# Patient Record
Sex: MALE | Race: WHITE | Employment: STUDENT | URBAN - METROPOLITAN AREA
[De-identification: names, ages, dates, MRNs, and addresses within clinical notes are randomized per-mention and may not be internally consistent; named-entity substitution may affect disease eponyms.]

---

## 2022-03-14 DIAGNOSIS — M25.561 RIGHT KNEE PAIN, UNSPECIFIED CHRONICITY: Primary | ICD-10-CM

## 2022-03-14 DIAGNOSIS — M54.9 BACK PAIN, UNSPECIFIED BACK LOCATION, UNSPECIFIED BACK PAIN LATERALITY, UNSPECIFIED CHRONICITY: ICD-10-CM

## 2022-03-15 ENCOUNTER — HOSPITAL ENCOUNTER (OUTPATIENT)
Dept: PHYSICAL THERAPY | Age: 22
Setting detail: THERAPIES SERIES
Discharge: HOME | End: 2022-03-15
Payer: COMMERCIAL

## 2022-03-15 PROCEDURE — 97140 MANUAL THERAPY 1/> REGIONS: CPT

## 2022-03-15 PROCEDURE — 97161 PT EVAL LOW COMPLEX 20 MIN: CPT

## 2022-03-15 PROCEDURE — 97110 THERAPEUTIC EXERCISES: CPT

## 2022-03-15 NOTE — PLAN OF CARE
Gil Salazar  Phone: [phone]   Fax:     [phone]                                                       Physical Therapy Certification    Dear Dr. Jessica Fontaine,    We had the pleasure of evaluating the following patient for physical therapy services at [address] [place] [address]. A summary of our findings can be found in the initial assessment below. This includes our plan of care. If you have any questions or concerns regarding these findings, please do not hesitate to contact me at the office phone number checked above. Thank you for the referral.       Physician Signature:_______________________________Date:__________________  By signing above (or electronic signature), therapists plan is approved by physician      Patient: Nixon Tim \"Darek\"  : 2000   MRN: [id number]  Referring Physician:  Dr. Jessica Fontaine      Evaluation Date: 3/15/2022      Medical Diagnosis Information:   M25.561 R knee pain; M54.9 Back pain, unspecified location, laterality, and chronicity    M25.561 R knee pain; M54.9 Back pain, unspecified location, laterality, and chronicity               Insurance information:  BCBS     Precautions/ Contra-indications: None. Latex Allergy:  [x]NO      []YES  Preferred Language for Healthcare:   [x]English       []other:    C-SSRS Triggered by Intake questionnaire (Past 2 wk assessment ):   [x] No, Questionnaire did not trigger screening.   [] Yes, Patient intake triggered C-SSRS Screening      [] C-SSRS Screening completed  [] PCP notified via Epic     SUBJECTIVE: Patient stated complaint:  R knee pain started when patient was playing soccer two weeks ago 22. Was dribbling soccer ball when he took a step with the R foot and felt the knee collapse out from underneath him.  Pain onset immediately following that incident. Low back pain in lower lumbar region has been present for awhile now. Does not remember exactly when it onset or any specific LAWRENCE that brought pain on. Low back always seems to feel very tight and stiff. Middle back pain is much more recent. It started about a week before the R knee pain when patient was pause deadlifting. Still feels very sharp discomfort at a very specific level of his back when he does any lifting activities that require triple extension like dead lifting, power cleaning, etc.    Relevant Medical History:  None. Functional Scale/Score:  29% disability - LEFS (57/80); 12% disability - LIBBY (6/50)    Pain Scale: R knee:  1/10 at start of session. 1/10 at best. 3/10 at worst.  Low back:  0/10 at start of session. 0/10 at best. 3/10 at worst.   Easing factors:   Provocative factors: R knee:  Squatting. Prolonged walking >1 mile. Low back:  Any movements like deadlift that require lumbar extension. Type: [x]Constant   [x]Intermittent  []Radiating [x]Localized []other:     Numbness/Tingling: Denies N/T. Occupation/School:  [employer].     Living Status/Prior Level of Function: Independent with ADLs and IADLs. OBJECTIVE:     ROM LEFT RIGHT   Lumbar Flex     Lumbar Ext     Lumbar SB          HIP Flex 120 120   HIP Abd     HIP Ext     HIP IR 25 18   HIP ER 28 38   Knee ext 0 0   Knee Flex WNL WNL  No pain. Ankle PF     Ankle DF     Ankle In     Ankle Ev     Strength  LEFT RIGHT   HIP Flexors     HIP Abductors 4+/5 4+/5   HIP Ext 4+/5 Good act pattern noted. Feels increased tightness/stiffness in low back 4+/5 Good act pattern noted. Feels increased tightness/stiffness in low back. Hip ER     Knee EXT (quad) 4+/5 4+/5 No pain. Knee Flex (HS) 4+/5 4/5 p! In anterior R knee. Ankle DF     Ankle PF     Ankle Inv     Ankle EV          Circumference  Mid apex  7 cm prox      No significant swelling evident.        Reflexes/Sensation:    [x]Dermatomes/Myotomes intact [x]Reflexes equal and normal bilaterally   []Other:    Joint mobility: Bilateral hip mobility; lumbar     []Normal    [x]Hypo   []Hyper    Palpation: Ttp to patellar tendon, mid-tendon portion. No pain reproduced with palpation to inferior patellar pole or tibial tuberosity. Functional Mobility/Transfers:   Squat:  Good mechanics noted; reports R anterior knee pain at the bottom of the squat at full depth; R anterior knee pain improved with manual tibial IR and with manual femoral ER. Posture: Significant lumbar paraspinal bulk noted; decreased lumbar lordosis; decreased thoracic kyphosis. Gait: No significant gait deviations noted, no antalgia evident. Orthopedic Special Tests: Hamstring flexibility mildly restricted bilaterally. Rectus femoris length normal bilaterally. Hip flexor length mildly restricted bilaterally. [x] Patient history, allergies, meds reviewed. Medical chart reviewed. See intake form. Review Of Systems (ROS):  [x]Performed Review of systems (Integumentary, CardioPulmonary, Neurological) by intake and observation. Intake form has been scanned into medical record. Patient has been instructed to contact their primary care physician regarding ROS issues if not already being addressed at this time.       Co-morbidities/Complexities (which will affect course of rehabilitation):   [x]None           Arthritic conditions   []Rheumatoid arthritis (M05.9)  []Osteoarthritis (M19.91)   Cardiovascular conditions   []Hypertension (I10)  []Hyperlipidemia (E78.5)  []Angina pectoris (I20)  []Atherosclerosis (I70)  []CVA Musculoskeletal conditions   []Disc pathology   []Congenital spine pathologies   []Prior surgical intervention  []Osteoporosis (M81.8)  []Osteopenia (M85.8)   Endocrine conditions   []Hypothyroid (E03.9)  []Hyperthyroid Gastrointestinal conditions   []Constipation (C82.81)   Metabolic conditions   []Morbid obesity (E66.01)  []Diabetes type 1(E10.65) or 2 (E11.65)   []Neuropathy (G60.9)     Pulmonary conditions   []Asthma (J45)  []Coughing   []COPD (J44.9)   Psychological Disorders  []Anxiety (F41.9)  []Depression (F32.9)   []Other:   []Other:          Barriers to/and or personal factors that will affect rehab potential:              []Age  []Sex    []Smoker              []Motivation/Lack of Motivation                        []Co-Morbidities              []Cognitive Function, education/learning barriers              []Environmental, home barriers              []profession/work barriers  []past PT/medical experience  [x]other:  High activity level; 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 Falls Risk Assessment (30 days):   [x] Falls Risk assessed and no intervention required. [] Falls Risk assessed and Patient requires intervention due to being higher risk   TUG score (>12s at risk):     [] Falls education provided, including         ASSESSMENT: Patient is a 25 y/o M who reports to PT with recent onset of R knee pain while playing soccer, potentially caused by a hyperextension mechanism, however, patient unsure exactly what happened. Has consistent onset of pain at full depth of squat. Pain resolves with manual femoral ER and manual tibial IR consistent with hip IR/ER mobility deficits. Manual hip mobilizations and self stretching resulted in improved pain with squat mechanics at session conclusion. Patient also notes chronic low back pain, but did recently have onset of mid back pain while pause deadlifting. Patient is very tight and stiff to T/S and lumbar segments and flexibility deficits evident with R knee assessment may also be contributing to low back pain sxs.  Patient will benefit from skilled PT to address deficits to enable full, safe return to PLOF without pain or limitations. Functional Impairments:     [x]Noted lumbar/proximal hip/LE hypomobility   [x]Decreased LE functional ROM   []Decreased core/proximal hip strength and neuromuscular control   []Decreased LE functional strength   [x]Reduced balance/proprioceptive control   []other:      Functional Activity Limitations (from functional questionnaire and intake)   [x]Reduced ability to tolerate prolonged functional positions   [x]Reduced ability or difficulty with changes of positions or transfers between positions   [x]Reduced ability to maintain good posture and demonstrate good body mechanics with sitting, bending, and lifting   []Reduced ability to sleep   [] Reduced ability or tolerance with driving and/or computer work   [x]Reduced ability to perform lifting, carrying tasks   [x]Reduced ability to squat   []Reduced ability to forward bend   [x]Reduced ability to ambulate prolonged functional periods/distances/surfaces   [x]Reduced ability to ascend/descend stairs   [x]Reduced ability to run, hop or jump   []other:     Participation Restrictions   []Reduced participation in self care activities   [x]Reduced participation in home management activities   [x]Reduced participation in work activities   []Reduced participation in social activities. [x]Reduced participation in sport activities. Classification :    []Signs/symptoms consistent with post-surgical status including decreased ROM, strength and function.    [x]Signs/symptoms consistent with joint sprain/strain   []Signs/symptoms consistent with patella-femoral syndrome   []Signs/symptoms consistent with knee OA/hip OA   []Signs/symptoms consistent with internal derangement of knee/Hip   []Signs/symptoms consistent with functional hip weakness/NMR control      []Signs/symptoms consistent with tendinitis/tendinosis    []signs/symptoms consistent with pathology which may benefit from Dry needling      []other:     Prognosis/Rehab Potential: [x]Excellent   []Good    []Fair   []Poor    Tolerance of evaluation/treatment:    [x]Excellent   []Good    []Fair   []Poor    Physical Therapy Evaluation Complexity Justification  [x] A history of present problem with:  [x] no personal factors and/or comorbidities that impact the plan of care;  []1-2 personal factors and/or comorbidities that impact the plan of care  []3 personal factors and/or comorbidities that impact the plan of care  [x] An examination of body systems using standardized tests and measures addressing any of the following: body structures and functions (impairments), activity limitations, and/or participation restrictions;:  [x] a total of 1-2 or more elements   [] a total of 3 or more elements   [] a total of 4 or more elements   [x] A clinical presentation with:  [x] stable and/or uncomplicated characteristics   [] evolving clinical presentation with changing characteristics  [] unstable and unpredictable characteristics;   [x] Clinical decision making of [x] low, [] moderate, [] high complexity using standardized patient assessment instrument and/or measurable assessment of functional outcome. [x] EVAL (LOW) 36064 (typically 20 minutes face-to-face)  [] EVAL (MOD) 22310 (typically 30 minutes face-to-face)  [] EVAL (HIGH) 11991 (typically 45 minutes face-to-face)  [] RE-EVAL     Frequency/Duration:  2 days per week for 4-6 Weeks:  Interventions:  [x]  Therapeutic exercise including: strength training, ROM, for Lower extremity and core   [x]  NMR activation and proprioception for LE, Glutes and Core   [x]  Manual therapy as indicated for LE, Hip and spine to include: Dry Needling/IASTM, STM, PROM, Gr I-IV mobilizations, manipulation. [x] Modalities as needed that may include: thermal agents, E-stim, Biofeedback, US, iontophoresis as indicated  [x] Patient education on joint protection, postural re-education, activity modification, progression of HEP.     HEP instruction: (see scanned forms)  Access Code: [email or web address]  URL: [email or web address]  Date: 03/15/2022  Prepared by: Azucena Montgomery    Exercises  Standing Hip ER Stretch at Table - 1-2 x daily - 7 x weekly - 1 sets - 3-5 reps - 20-30s hold  Hip External Rotation Stretch - 1-2 x daily - 7 x weekly - 1 sets - 3-5 reps - 20-30s hold  Supine Hamstring Stretch with Doorway - 1-2 x daily - 7 x weekly - 1 sets - 3-5 reps - 20-30s hold  Half Kneeling Hip Flexor Stretch with Chair - 1-2 x daily - 7 x weekly - 1 sets - 3-5 reps - 20-30s hold    GOALS:  Patient stated goal: Squat without knee pain. Be able to lift without back pain. [] Progressing: [] Met: [] Not Met: [] Adjusted    Therapist goals for Patient:   Short Term Goals: To be achieved in: 2 weeks  1. Independent in HEP and progression per patient tolerance, in order to prevent re-injury. [] Progressing: [] Met: [] Not Met: [] Adjusted  2. Patient will have a decrease in pain to facilitate improvement in movement, function, and ADLs as indicated by Functional Deficits. [] Progressing: [] Met: [] Not Met: [] Adjusted    Long Term Goals: To be achieved in: 4-6 weeks  1. Disability index score of 10% or less for the LEFS to assist with reaching prior level of function. [] Progressing: [] Met: [] Not Met: [] Adjusted  2. Disability index score of 5% or less for the LIBBY to assist with reaching prior level of function. [] Progressing: [] Met: [] Not Met: [] Adjusted  3. Patient will be able to walk >2 miles around campus without R knee symptoms or restriction. [] Progressing: [] Met: [] Not Met: [] Adjusted  4. Patient will be able squat under load without increased R knee symptoms or restriction. [] Progressing: [] Met: [] Not Met: [] Adjusted  5. Patient will be able to deadlift under load without increased R knee symptoms or restriction.   [] Progressing: [] Met: [] Not Met: [] Adjusted     Electronically signed by:  Azucena Montgomery, PT, DPT, MS, SCS

## 2022-03-15 NOTE — FLOWSHEET NOTE
[facility] [address] [facility]  Phone: [phone] Fax: [phone]    Physical Therapy Treatment Note/ Progress Report:     Date:  3/15/2022    Patient Name:  Douglas Najjar  \"Darek\" :  2000  MRN: [id number]  Restrictions/Precautions:    Medical/Treatment Diagnosis Information:  ·  M25.561 R knee pain; M54.9 Back pain, unspecified location, laterality, and chronicity  ·  M25.561 R knee pain; M54.9 Back pain, unspecified location, laterality, and chronicity  Insurance/Certification information:   Tariq Mcdonald [address]   Physician Information:   Dr. Eliud Roberts of care signed (Y/N):     Date of Patient follow up with Physician:      Progress Report: [x]  Yes  []  No     Date Range for reporting period:  Beginning:  3/15/22  Endin days or 10 visits    Progress report due (10 Rx/or 30 days whichever is less):      Recertification due (POC duration/ or 90 days whichever is less): 4/15/22     Visit # Insurance Allowable Auth Needed   1 BCBS - 75 visits []Yes   [x]No     Latex Allergy:  [x]NO      []YES  Preferred Language for Healthcare:   [x]English       []other:  Functional Scale: 29% disability - LEFS (57/80); 12% disability - LIBBY (6/50)  Date assessed:  3/15/22    Pain level:  R knee:  1/10 at start of session. 1/10 at best. 3/10 at worst.  Low back:  0/10 at start of session. 0/10 at best. 3/10 at worst.      SUBJECTIVE:  See eval    OBJECTIVE: See eval   Observation:    Test measurements:      RESTRICTIONS/PRECAUTIONS:    Exercises/Interventions:     Therapeutic Ex (04443)  Sets/sec Reps Notes/CUES   Hip ER stretch on mat - standing 20-30s 3-5 bilat   [place] stretch 20-30s 3-5 bilat   Half kneeling rectus femoris stretch w/strap 20-30s 3-5 bilat   Hamstring stretch in doorway  2-3 min LLLD stretch; bilat         Patient education.   8 min Findings, purpose, focus, goals of PT; HEP                           Manual Intervention (20279)      Hip mobs - inferior glide, inferior lateral glide, IR/ER mobs bilat 15 min W/mulligan belt; Gr. II-III   Lumbar PA mobs  nv                            NMR re-education (69456)   CUES NEEDED   [place]/Biofeedback 10/10      BFR      G. Med activation      Hip Ext full ROM/ G. Activation      Bosu Bal and Prop- G Med      Single leg stance/Balance/Prop      Bosu Retro G. Med act      Prone Hip froggers- sliders/elevated            Therapeutic Activity (17488)      flipClass      Dynamic Balance                          Access Code: [email or web address]  URL: [email or web address]  Date: 03/15/2022  Prepared by: Reilly Arcos     Exercises  Standing Hip ER Stretch at Table - 1-2 x daily - 7 x weekly - 1 sets - 3-5 reps - 20-30s hold  Hip External Rotation Stretch - 1-2 x daily - 7 x weekly - 1 sets - 3-5 reps - 20-30s hold  Supine Hamstring Stretch with Doorway - 1-2 x daily - 7 x weekly - 1 sets - 3-5 reps - 20-30s hold  Half Kneeling Hip Flexor Stretch with Chair - 1-2 x daily - 7 x weekly - 1 sets - 3-5 reps - 20-30s hold    Therapeutic Exercise and NMR EXR  [x] (79570) Provided verbal/tactile cueing for activities related to strengthening, flexibility, endurance, ROM for improvements in LE, proximal hip, and core control with self care, mobility, lifting, ambulation. [x] (30240) Provided verbal/tactile cueing for activities related to improving balance, coordination, kinesthetic sense, posture, motor skill, proprioception  to assist with LE, proximal hip, and core control in self care, mobility, lifting, ambulation and eccentric single leg control. NMR and Therapeutic Activities:    [x] (18842 or 19974) Provided verbal/tactile cueing for activities related to improving balance, coordination, kinesthetic sense, posture, motor skill, proprioception and motor activation to allow for proper function of core, proximal hip and LE with self care and ADLs and functional mobility.    [] (57152) Gait Re-education- Provided training and instruction to the patient for proper LE, core and proximal hip recruitment and positioning and eccentric body weight control with ambulation re-education including up and down stairs     Home Exercise Program:    [x] (18344) Reviewed/Progressed HEP activities related to strengthening, flexibility, endurance, ROM of core, proximal hip and LE for functional self-care, mobility, lifting and ambulation/stair navigation   [] (75855)Reviewed/Progressed HEP activities related to improving balance, coordination, kinesthetic sense, posture, motor skill, proprioception of core, proximal hip and LE for self care, mobility, lifting, and ambulation/stair navigation      Manual Treatments:  PROM / STM / Oscillations-Mobs:  G-I, II, III, IV (PA's, Inf., Post.)  [x] (09440) Provided manual therapy to mobilize LE, proximal hip and/or LS spine soft tissue/joints for the purpose of modulating pain, promoting relaxation,  increasing ROM, reducing/eliminating soft tissue swelling/inflammation/restriction, improving soft tissue extensibility and allowing for proper ROM for normal function with self care, mobility, lifting and ambulation. Modalities:     [] GAME READY (VASO)- for significant edema, swelling, pain control. Charges:  Timed Code Treatment Minutes: 30   Total Treatment Minutes: 50      [x] EVAL (LOW) 74917 (typically 20 minutes face-to-face)  [] EVAL (MOD) 08050 (typically 30 minutes face-to-face)  [] EVAL (HIGH) 98061 (typically 45 minutes face-to-face)  [] RE-EVAL     [x] DZ(10223) x 1    [] DRY NEEDLE 1 OR 2 MUSCLES  [] NMR (57388) x     [] DRY NEEDLE 3+ MUSCLES  [x] Manual (91465) x 1      [] TA (66142) x     [] Mech Traction (00669)  [] ES(attended) (88473)     [] ES (un) (00583):   [] VASO (33651)  [] Other:    GOALS:  Patient stated goal: Squat without knee pain. Be able to lift without back pain.    [] Progressing: [] Met: [] Not Met: [] Adjusted    Therapist goals for Patient:   Short Term Goals: To be achieved in: 2 weeks  1. Independent in HEP and progression per patient tolerance, in order to prevent re-injury. [] Progressing: [] Met: [] Not Met: [] Adjusted  2. Patient will have a decrease in pain to facilitate improvement in movement, function, and ADLs as indicated by Functional Deficits. [] Progressing: [] Met: [] Not Met: [] Adjusted    Long Term Goals: To be achieved in: 4-6 weeks  1. Disability index score of 10% or less for the LEFS to assist with reaching prior level of function. [] Progressing: [] Met: [] Not Met: [] Adjusted  2. Disability index score of 5% or less for the LIBBY to assist with reaching prior level of function. [] Progressing: [] Met: [] Not Met: [] Adjusted  3. Patient will be able to walk >2 miles around campus without R knee symptoms or restriction. [] Progressing: [] Met: [] Not Met: [] Adjusted  4. Patient will be able squat under load without increased R knee symptoms or restriction. [] Progressing: [] Met: [] Not Met: [] Adjusted  5. Patient will be able to dead lift under load without increased R knee symptoms or restriction. [] Progressing: [] Met: [] Not Met: [] Adjusted     ASSESSMENT:  See eval    Return to Play: (if applicable)   []  Stage 1: Intro to Strength   []  Stage 2: Return to Run and Strength   []  Stage 3: Return to Jump and Strength   []  Stage 4: Dynamic Strength and Agility   []  Stage 5: Sport Specific Training     []  Ready to Return to Play, Meets All Above Stages   []  Not Ready for Return to Sports   Comments:            Treatment/Activity Tolerance:  [x] Patient tolerated treatment well [] Patient limited by fatique  [] Patient limited by pain  [] Patient limited by other medical complications  [] Other:     Overall Progression Towards Functional goals/ Treatment Progress Update:  [] Patient is progressing as expected towards functional goals listed.     [] Progression is slowed due to complexities/Impairments listed. [] Progression has been slowed due to co-morbidities. [x] Plan just implemented, too soon to assess goals progression <30days   [] Goals require adjustment due to lack of progress  [] Patient is not progressing as expected and requires additional follow up with physician  [] Other    Prognosis for POC: [x] Good [] Fair  [] Poor    Patient requires continued skilled intervention: [x] Yes  [] No        PLAN: See eval  [] Continue per plan of care [] Alter current plan (see comments)  [x] Plan of care initiated [] Hold pending MD visit [] Discharge    Electronically signed by: South Gomez, PT, DPT, MS, SCS    Note: If patient does not return for scheduled/recommended follow up visits, this note will serve as a discharge from care along with the most recent update on progress.

## 2022-03-29 ENCOUNTER — HOSPITAL ENCOUNTER (OUTPATIENT)
Dept: PHYSICAL THERAPY | Age: 22
Setting detail: THERAPIES SERIES
Discharge: HOME | End: 2022-03-29
Payer: COMMERCIAL

## 2022-03-29 PROCEDURE — 97110 THERAPEUTIC EXERCISES: CPT

## 2022-03-29 PROCEDURE — 97112 NEUROMUSCULAR REEDUCATION: CPT

## 2022-03-29 PROCEDURE — 97140 MANUAL THERAPY 1/> REGIONS: CPT

## 2022-03-29 NOTE — FLOWSHEET NOTE
[address] [place] [address] [place] [address]  Phone: [phone] Fax: [phone]    Physical Therapy Treatment Note/ Progress Report:     Date:  3/29/2022    Patient Name:  Reece Justin  \"Darek\" :  2000  MRN: [id number]  Restrictions/Precautions:    Medical/Treatment Diagnosis Information:  ·  M25.561 R knee pain; M54.9 Back pain, unspecified location, laterality, and chronicity  ·  M25.561 R knee pain; M54.9 Back pain, unspecified location, laterality, and chronicity  Insurance/Certification information:   [facility]   Physician Information:   Dr. Alex Erazo of care signed (Y/N):     Date of Patient follow up with Physician:      Progress Report: []  Yes  [x]  No     Date Range for reporting period:  Beginning:  3/15/22  Endin days or 10 visits    Progress report due (10 Rx/or 30 days whichever is less):      Recertification due (POC duration/ or 90 days whichever is less): 4/15/22     Visit # Insurance Allowable Auth Needed   2 BCBS - 75 visits []Yes   [x]No     Latex Allergy:  [x]NO      []YES  Preferred Language for Healthcare:   [x]English       []other:  Functional Scale: 29% disability - LEFS (57/80); 12% disability - LIBBY (6/50)  Date assessed:  3/15/22    Pain level:  R knee:  1/10 at start of session; 3/10 at worst.  Low back:  0/10 at start of session; 3/10 at worst.      SUBJECTIVE:  L knee pain improved since initial visit. Able to get full depth body weight squat repetitively pain free. Has had some intermittent sharp pain on the lateral L shin just below the knee, but inconsistent. Mid back pain seems to be causing more difficulty now than L knee. Low back pain still reproduced with any triple extension activities like deadlifting and push pressing.      OBJECTIVE: See eval   Observation:    Test measurements:      RESTRICTIONS/PRECAUTIONS:    Exercises/Interventions:     Therapeutic Ex (05091)  Sets/sec Reps Notes/CUES   Hip ER stretch on mat - standing 20-30s 3-5 Bilat; HEP   [place] stretch 20-30s 3-5 Bilat; HEP   Half kneeling rectus femoris stretch w/strap 20-30s 3-5 Bilat; HEP   Hamstring stretch in doorway  2-3 min LLLD stretch; bilat   TS self mobs w/foam roller 1 6-12 Each segment   Sidelying T/L rotation stretch 1 10 bilat   T/S ext over chair back   Attempted, but patient extends only at painful segment resulting in increased pain, so HELD. Manual Intervention (80811)      Hip mobs - inferior glide, inferior lateral glide, IR/ER mobs bilat 10 min W/mulligan belt; Gr. II-III   T/S PA mobs  5 min    STMob to T/S paraspinals bilat  5 min    Prone/supine T/S manip  3 min Gr. V   CT junction manip  2 min Gr. V   Sidelying lumbar roll manip  3 min Bilat; Gr. V         NMR re-education (48375)   CUES NEEDED   Flying squirrel - glut act 10s 10 No knee lift   Half kneeling glut act w/chops - on airex 2 10 bilat                                             Therapeutic Activity (86542)      Ladders      [employer]      Dynamic Balance                          Access Code: [id number]  URL: [email or web address]  Date: 03/15/2022  Prepared by: Shandra Larkin      Exercises  Standing Hip ER Stretch at Table - 1-2 x daily - 7 x weekly - 1 sets - 3-5 reps - 20-30s hold  Hip External Rotation Stretch - 1-2 x daily - 7 x weekly - 1 sets - 3-5 reps - 20-30s hold  Supine Hamstring Stretch with Doorway - 1-2 x daily - 7 x weekly - 1 sets - 3-5 reps - 20-30s hold  Half Kneeling Hip Flexor Stretch with Chair - 1-2 x daily - 7 x weekly - 1 sets - 3-5 reps - 20-30s hold  Sidelying Open Book Thoracic Rotation with Knee on Foam Roll - 1-2 x daily - 7 x weekly - 1 sets - 10 reps  Thoracic Extension Mobilization on Foam Roll - 1-2 x daily - 7 x weekly - 1 sets - 10 reps      Therapeutic Exercise and NMR EXR  [x] (26055) Provided verbal/tactile cueing for activities related to strengthening, flexibility, endurance, ROM for improvements in LE, proximal hip, and core control with self care, mobility, lifting, ambulation. [x] (21753) Provided verbal/tactile cueing for activities related to improving balance, coordination, kinesthetic sense, posture, motor skill, proprioception  to assist with LE, proximal hip, and core control in self care, mobility, lifting, ambulation and eccentric single leg control. NMR and Therapeutic Activities:    [x] (98511 or 11952) Provided verbal/tactile cueing for activities related to improving balance, coordination, kinesthetic sense, posture, motor skill, proprioception and motor activation to allow for proper function of core, proximal hip and LE with self care and ADLs and functional mobility.   [] (93981) Gait Re-education- Provided training and instruction to the patient for proper LE, core and proximal hip recruitment and positioning and eccentric body weight control with ambulation re-education including up and down stairs     Home Exercise Program:    [x] (76491) Reviewed/Progressed HEP activities related to strengthening, flexibility, endurance, ROM of core, proximal hip and LE for functional self-care, mobility, lifting and ambulation/stair navigation   [] (37126)Reviewed/Progressed HEP activities related to improving balance, coordination, kinesthetic sense, posture, motor skill, proprioception of core, proximal hip and LE for self care, mobility, lifting, and ambulation/stair navigation      Manual Treatments:  PROM / STM / Oscillations-Mobs:  G-I, II, III, IV (PA's, Inf., Post.)  [x] (45771) Provided manual therapy to mobilize LE, proximal hip and/or LS spine soft tissue/joints for the purpose of modulating pain, promoting relaxation,  increasing ROM, reducing/eliminating soft tissue swelling/inflammation/restriction, improving soft tissue extensibility and allowing for proper ROM for normal function with self care, mobility, lifting and ambulation.      Modalities:     [] GAME READY (VASO)- for significant edema, swelling, pain control. Charges:  Timed Code Treatment Minutes: 55   Total Treatment Minutes: 55      [] EVAL (LOW) 73297 (typically 20 minutes face-to-face)  [] EVAL (MOD) 31948 (typically 30 minutes face-to-face)  [] EVAL (HIGH) 67020 (typically 45 minutes face-to-face)  [] RE-EVAL     [x] WF(29432) x 1    [] DRY NEEDLE 1 OR 2 MUSCLES  [x] NMR (39910) x 1    [] DRY NEEDLE 3+ MUSCLES  [x] Manual (31488) x 2      [] TA (84958) x     [] Mech Traction (57023)  [] ES(attended) (32126)     [] ES (un) (85975):   [] VASO (56641)  [] Other:    GOALS:  Patient stated goal: Squat without knee pain. Be able to lift without back pain. [] Progressing: [] Met: [] Not Met: [] Adjusted    Therapist goals for Patient:   Short Term Goals: To be achieved in: 2 weeks  1. Independent in HEP and progression per patient tolerance, in order to prevent re-injury. [] Progressing: [] Met: [] Not Met: [] Adjusted  2. Patient will have a decrease in pain to facilitate improvement in movement, function, and ADLs as indicated by Functional Deficits. [] Progressing: [] Met: [] Not Met: [] Adjusted    Long Term Goals: To be achieved in: 4-6 weeks  1. Disability index score of 10% or less for the LEFS to assist with reaching prior level of function. [] Progressing: [] Met: [] Not Met: [] Adjusted  2. Disability index score of 5% or less for the LIBBY to assist with reaching prior level of function. [] Progressing: [] Met: [] Not Met: [] Adjusted  3. Patient will be able to walk >2 miles around campus without R knee symptoms or restriction. [] Progressing: [] Met: [] Not Met: [] Adjusted  4. Patient will be able squat under load without increased R knee symptoms or restriction. [] Progressing: [] Met: [] Not Met: [] Adjusted  5. Patient will be able to dead lift under load without increased R knee symptoms or restriction.   [] Progressing: [] Met: [] Not Met: [] Adjusted     ASSESSMENT:  L knee pain improved with squatting since initial visit. Able to reach full depth without L knee pain. Bilateral hip ROM/mobility is improved compared to previous, but still some mild restrictions. Patient very stiff and restricted in CT junction and T/S, likely why he is feeling significant pain at T/L junction with any triple extension tasks like deadlifting, push press, etc. Addressed CT junction, T/S, and even lumbar mobility limitations with manual joint mobilizations/manips today, then initiated tasks to improve glut firing pattern as patient tends to immediately fire lumbar extensors with delayed glut activation likely further contributing to inefficient loading pattern at T/L junction and lumbar spine causing patient's pain sxs. Return to Play: (if applicable)   []  Stage 1: Intro to Strength   []  Stage 2: Return to Run and Strength   []  Stage 3: Return to Jump and Strength   []  Stage 4: Dynamic Strength and Agility   []  Stage 5: Sport Specific Training     []  Ready to Return to Play, Meets All Above Stages   []  Not Ready for Return to Sports   Comments:            Treatment/Activity Tolerance:  [x] Patient tolerated treatment well [] Patient limited by fatique  [] Patient limited by pain  [] Patient limited by other medical complications  [] Other:     Overall Progression Towards Functional goals/ Treatment Progress Update:  [] Patient is progressing as expected towards functional goals listed. [] Progression is slowed due to complexities/Impairments listed. [] Progression has been slowed due to co-morbidities.   [x] Plan just implemented, too soon to assess goals progression <30days   [] Goals require adjustment due to lack of progress  [] Patient is not progressing as expected and requires additional follow up with physician  [] Other    Prognosis for POC: [x] Good [] Fair  [] Poor    Patient requires continued skilled intervention: [x] Yes  [] No        PLAN: See eval  [] Continue per plan of care []

## 2022-03-31 ENCOUNTER — HOSPITAL ENCOUNTER (OUTPATIENT)
Dept: PHYSICAL THERAPY | Age: 22
Setting detail: THERAPIES SERIES
Discharge: HOME | End: 2022-03-31
Payer: COMMERCIAL

## 2022-03-31 PROCEDURE — 97110 THERAPEUTIC EXERCISES: CPT

## 2022-03-31 PROCEDURE — 97140 MANUAL THERAPY 1/> REGIONS: CPT

## 2022-03-31 PROCEDURE — 97112 NEUROMUSCULAR REEDUCATION: CPT

## 2022-03-31 NOTE — FLOWSHEET NOTE
[place] [address] [facility]  Phone: [phone] Fax: [phone]    Physical Therapy Treatment Note/ Progress Report:     Date:  3/31/2022    Patient Name:  Gio Izaguirre  \"Darek\" :  2000  MRN: [id number]  Restrictions/Precautions:    Medical/Treatment Diagnosis Information:  ·  M25.561 R knee pain; M54.9 Back pain, unspecified location, laterality, and chronicity  ·  M25.561 R knee pain; M54.9 Back pain, unspecified location, laterality, and chronicity  Insurance/Certification information:   [facility]   Physician Information:   Dr. Ingris Coronado of care signed (Y/N):     Date of Patient follow up with Physician:      Progress Report: []  Yes  [x]  No     Date Range for reporting period:  Beginning:  3/15/22  Endin days or 10 visits    Progress report due (10 Rx/or 30 days whichever is less):      Recertification due (POC duration/ or 90 days whichever is less): 4/15/22     Visit # Insurance Allowable Auth Needed   3 BCBS - 75 visits []Yes   [x]No     Latex Allergy:  [x]NO      []YES  Preferred Language for Healthcare:   [x]English       []other:  Functional Scale: 29% disability - LEFS (57/80); 12% disability - LIBBY (6/50)  Date assessed:  3/15/22    Pain level:  R knee:  1/10 at start of session; 3/10 at worst.  Low back:  0/10 at start of session; 3/10 at worst.      SUBJECTIVE:  Low back was pretty irritated after previous session. 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 Thinks this could have contributed to increased low back sxs following session. Also having intermittent discomfort on R lateral knee. This pain feels different than his original sx complaints. Direct pressure to that area seems to irritate that pain and during the up portion of a body weight squat.  Does sometimes feel it with walking, but not consistent. OBJECTIVE: See eval   Observation:    Test measurements:      RESTRICTIONS/PRECAUTIONS:    Exercises/Interventions:     Therapeutic Ex (89171)  Sets/sec Reps Notes/CUES   Hip ER stretch on mat - standing 20-30s 3-5 Bilat; HEP   [place] stretch 20-30s 3-5 Bilat; HEP   Half kneeling rectus femoris stretch w/strap 20-30s 3-5 Bilat; HEP   Hamstring stretch in doorway  2-3 min LLLD stretch; bilat; HEP   TS self mobs w/foam roller 1 6-12 Each segment   Sidelying T/L rotation stretch 1 10 bilat   T/S ext over chair back   Attempted, but patient extends only at painful segment resulting in increased pain, so HELD. Seated tibial IR/ER 2 10 To improve tibial mobility               Manual Intervention (82283)      Hip mobs - inferior glide, inferior lateral glide, IR/ER mobs bilat 8 min W/mulligan belt; Gr. II-III   T/S PA mobs  5 min    STMob to T/S paraspinals bilat  5 min    Prone/supine T/S manip  3 min Gr. V   CT junction manip  2 min Gr. V   Sidelying lumbar roll manip  3 min Bilat; Gr. V   R proximal fibular mobs  2 min Gr. II-III         NMR re-education (57950)   CUES NEEDED   Flying squirrel - glut act 10s 10 with knee lift   Half kneeling glut act - on airex 2  20s 10  5 Chops on L  Holds on R - very challenging to chop   Mod. RDL/hip airplanes - back LE supported on wall, hand support in front 1 10 Bilat; Cues for glut act; Really struggles to actively utilize R glut                                       Therapeutic Activity (92009)      [employer]      Dynamic Balance                          Access Code: [email or web address]  URL: [email or web address]  Date: 03/15/2022  Prepared by: Viviana Eason      Exercises  Standing Hip ER Stretch at Table - 1-2 x daily - 7 x weekly - 1 sets - 3-5 reps - 20-30s hold  Hip External Rotation Stretch - 1-2 x daily - 7 x weekly - 1 sets - 3-5 reps - 20-30s hold  Supine Hamstring Stretch with Doorway - 1-2 x daily - 7 x weekly - 1 sets - 3-5 reps - 20-30s hold  Half Kneeling Hip Flexor Stretch with Chair - 1-2 x daily - 7 x weekly - 1 sets - 3-5 reps - 20-30s hold  Sidelying Open Book Thoracic Rotation with Knee on Foam Roll - 1-2 x daily - 7 x weekly - 1 sets - 10 reps  Thoracic Extension Mobilization on Foam Roll - 1-2 x daily - 7 x weekly - 1 sets - 10 reps      Therapeutic Exercise and NMR EXR  [x] (03672) Provided verbal/tactile cueing for activities related to strengthening, flexibility, endurance, ROM for improvements in LE, proximal hip, and core control with self care, mobility, lifting, ambulation. [x] (24684) Provided verbal/tactile cueing for activities related to improving balance, coordination, kinesthetic sense, posture, motor skill, proprioception  to assist with LE, proximal hip, and core control in self care, mobility, lifting, ambulation and eccentric single leg control.      NMR and Therapeutic Activities:    [x] (55595 or 62160) Provided verbal/tactile cueing for activities related to improving balance, coordination, kinesthetic sense, posture, motor skill, proprioception and motor activation to allow for proper function of core, proximal hip and LE with self care and ADLs and functional mobility.   [] (31320) Gait Re-education- Provided training and instruction to the patient for proper LE, core and proximal hip recruitment and positioning and eccentric body weight control with ambulation re-education including up and down stairs     Home Exercise Program:    [x] (00544) Reviewed/Progressed HEP activities related to strengthening, flexibility, endurance, ROM of core, proximal hip and LE for functional self-care, mobility, lifting and ambulation/stair navigation   [] (12121)Reviewed/Progressed HEP activities related to improving balance, coordination, kinesthetic sense, posture, motor skill, proprioception of core, proximal hip and LE for self care, mobility, lifting, and ambulation/stair navigation      Manual Treatments:  PROM / STM / Oscillations-Mobs:  G-I, II, III, IV (PA's, Inf., Post.)  [x] (30842) Provided manual therapy to mobilize LE, proximal hip and/or LS spine soft tissue/joints for the purpose of modulating pain, promoting relaxation,  increasing ROM, reducing/eliminating soft tissue swelling/inflammation/restriction, improving soft tissue extensibility and allowing for proper ROM for normal function with self care, mobility, lifting and ambulation. Modalities:     [] GAME READY (VASO)- for significant edema, swelling, pain control. Charges:  Timed Code Treatment Minutes: 55   Total Treatment Minutes: 55      [] EVAL (LOW) 02771 (typically 20 minutes face-to-face)  [] EVAL (MOD) 80084 (typically 30 minutes face-to-face)  [] EVAL (HIGH) 65062 (typically 45 minutes face-to-face)  [] RE-EVAL     [x] SF(09891) x 1    [] DRY NEEDLE 1 OR 2 MUSCLES  [x] NMR (09780) x 1    [] DRY NEEDLE 3+ MUSCLES  [x] Manual (16354) x 2      [] TA (37305) x     [] Mech Traction (82334)  [] ES(attended) (91791)     [] ES (un) (81934):   [] VASO (89353)  [] Other:    GOALS:  Patient stated goal: Squat without knee pain. Be able to lift without back pain. [] Progressing: [] Met: [] Not Met: [] Adjusted    Therapist goals for Patient:   Short Term Goals: To be achieved in: 2 weeks  1. Independent in HEP and progression per patient tolerance, in order to prevent re-injury. [] Progressing: [] Met: [] Not Met: [] Adjusted  2. Patient will have a decrease in pain to facilitate improvement in movement, function, and ADLs as indicated by Functional Deficits. [] Progressing: [] Met: [] Not Met: [] Adjusted    Long Term Goals: To be achieved in: 4-6 weeks  1. Disability index score of 10% or less for the LEFS to assist with reaching prior level of function. [] Progressing: [] Met: [] Not Met: [] Adjusted  2. Disability index score of 5% or less for the LIBBY to assist with reaching prior level of function.   [] Progressing: [] Met: [] Not Met: [] Adjusted  3. Patient will be able to walk >2 miles around campus without R knee symptoms or restriction. [] Progressing: [] Met: [] Not Met: [] Adjusted  4. Patient will be able squat under load without increased R knee symptoms or restriction. [] Progressing: [] Met: [] Not Met: [] Adjusted  5. Patient will be able to dead lift under load without increased R knee symptoms or restriction. [] Progressing: [] Met: [] Not Met: [] Adjusted     ASSESSMENT:  Patient now noting occasional R lateral knee pain at fibular head with direct pressure such as when kneeling on R knee or with concentric phase of squatting. Patient's sxs improved with manual tibial IR during concentric phase of the squat. Tibial IR restricted when assessed in static sitting. Hamstring stretching also improves lateral knee sxs after onset likely because hamstring attaches into fibular head. Continued all manual techniques addressing CT junction, T/S, and even lumbar mobility limitations today followed by continued progression of tasks to improve glut firing pattern. Patient tends to immediately fire lumbar extensors with delayed glut activation likely further contributing to inefficient loading pattern at T/L junction and lumbar spine causing patient's pain sxs. Modified RDL/hip airplanes were very challenging for patient on R side in particular. Patient really struggles to volitionally activate R glut med/min with hip airplanes even when supported on wall.     Return to Play: (if applicable)   []  Stage 1: Intro to Strength   []  Stage 2: Return to Run and Strength   []  Stage 3: Return to Jump and Strength   []  Stage 4: Dynamic Strength and Agility   []  Stage 5: Sport Specific Training     []  Ready to Return to Play, Meets All Above Stages   []  Not Ready for Return to Sports   Comments:            Treatment/Activity Tolerance:  [x] Patient tolerated treatment well [] Patient limited by fatique  [] Patient limited by pain  [] Patient limited by other medical complications  [] Other:     Overall Progression Towards Functional goals/ Treatment Progress Update:  [] Patient is progressing as expected towards functional goals listed. [] Progression is slowed due to complexities/Impairments listed. [] Progression has been slowed due to co-morbidities. [x] Plan just implemented, too soon to assess goals progression <30days   [] Goals require adjustment due to lack of progress  [] Patient is not progressing as expected and requires additional follow up with physician  [] Other    Prognosis for POC: [x] Good [] Fair  [] Poor    Patient requires continued skilled intervention: [x] Yes  [] No        PLAN: See eval  [] Continue per plan of care [] Alter current plan (see comments)  [x] Plan of care initiated [] Hold pending MD visit [] Discharge    Electronically signed by: Yashira Wyman, PT, DPT, MS, SCS    Note: If patient does not return for scheduled/recommended follow up visits, this note will serve as a discharge from care along with the most recent update on progress.

## 2022-04-05 ENCOUNTER — HOSPITAL ENCOUNTER (OUTPATIENT)
Dept: PHYSICAL THERAPY | Age: 22
Setting detail: THERAPIES SERIES
Discharge: HOME | End: 2022-04-05
Payer: COMMERCIAL

## 2022-04-05 PROCEDURE — 97140 MANUAL THERAPY 1/> REGIONS: CPT

## 2022-04-05 PROCEDURE — 97110 THERAPEUTIC EXERCISES: CPT

## 2022-04-05 PROCEDURE — 97112 NEUROMUSCULAR REEDUCATION: CPT

## 2022-04-05 NOTE — FLOWSHEET NOTE
[place] [address] [facility]  Phone: [phone] Fax: [phone]    Physical Therapy Treatment Note/ Progress Report:     Date:  2022    Patient Name:  Nitin Berrios  \"Darek\" :  2000  MRN: [id number]  Restrictions/Precautions:    Medical/Treatment Diagnosis Information:  ·  M25.561 R knee pain; M54.9 Back pain, unspecified location, laterality, and chronicity  ·  M25.561 R knee pain; M54.9 Back pain, unspecified location, laterality, and chronicity  Insurance/Certification information:   Rebecca Barnett   Physician Information:   Dr. Guillaume Carranza of care signed (Y/N):     Date of Patient follow up with Physician:      Progress Report: []  Yes  [x]  No     Date Range for reporting period:  Beginning:  3/15/22  Endin days or 10 visits    Progress report due (10 Rx/or 30 days whichever is less):      Recertification due (POC duration/ or 90 days whichever is less): 4/15/22     Visit # Insurance Allowable Auth Needed   4 BCBS - 75 visits []Yes   [x]No     Latex Allergy:  [x]NO      []YES  Preferred Language for Healthcare:   [x]English       []other:  Functional Scale: 29% disability - LEFS (57/80); 12% disability - LIBBY (6/50)  Date assessed:  3/15/22    Pain level:  R knee:  1/10 at start of session; 3/10 at worst.  Low back:  0/10 at start of session; 3/10 at worst.      SUBJECTIVE:  Low back not as irritated after previous session. Still having intermittent discomfort on R lateral knee, but pain is more in the back of the R lateral knee now rather than directly on the bone. Direct pressure to that area still seems to irritate pain on the bony part of the outside of the R knee. Does feel it in the back of the R knee after prolonged standing walking.     OBJECTIVE: See eval   Observation:    Test measurements:      RESTRICTIONS/PRECAUTIONS:    Exercises/Interventions:     Therapeutic Ex (63581)  Sets/sec Reps Notes/CUES   Hip ER stretch on mat - standing 20-30s 3-5 Bilat; HEP   [place] stretch 20-30s 3-5 Bilat; HEP   Half kneeling rectus femoris stretch w/strap 20-30s 3-5 Bilat; HEP   Hamstring stretch in doorway  2-3 min LLLD stretch; bilat; HEP   TS self mobs w/foam roller 1 6-12 Each segment   Sidelying T/L rotation stretch 1 10 bilat   Quadruped T/S rotations 1 10 Cues to drive through stance UE for SA act   T/S ext over chair back   Attempted, but patient extends only at painful segment resulting in increased pain, so HELD. Subscap/lat stretch on table 10s 10 bilat   Sleeper stretch 10s 10 On R   Doorway ER stretch - 60 deg elevation, low angle 10s 10 bilat         Seated tibial IR/ER 2 10 To improve tibial mobility               Manual Intervention (39496)      Hip mobs - inferior glide, inferior lateral glide, IR/ER mobs bilat 8 min W/mulligan belt; Gr. II-III   T/S PA mobs  5 min    STMob to T/S paraspinals bilat  5 min    Prone/supine T/S manip  3 min Gr. V   CT junction manip  2 min Gr. V   Sidelying lumbar roll manip  3 min Bilat; Gr. V               NMR re-education (72487)   CUES NEEDED   Flying squirrel - glut act 10s 10 with knee lift   Half kneeling glut act - on airex 2  20s 10  5 Chops on L  Holds on R - very challenging to chop   Mod. RDL/hip airplanes - back LE supported on wall, hand support in front 1 10 Bilat; Cues for glut act; Really struggles to actively utilize R glut         Serratus push up on wall 2 10 Motor control focus                           Therapeutic Activity (29815)      [facility]      Dynamic Balance                          Access Code: [email or web address]  URL: [email or web address]  Date: 03/15/2022  Prepared by: Guero Hi      Exercises  Standing Hip ER Stretch at Table - 1-2 x daily - 7 x weekly - 1 sets - 3-5 reps - 20-30s hold  Hip External Rotation Stretch - 1-2 x daily - 7 x weekly - 1 sets - 3-5 reps - 20-30s hold  Supine Hamstring Stretch with Doorway - 1-2 x daily - 7 x weekly - 1 sets - 3-5 reps - 20-30s hold  Half Kneeling Hip Flexor Stretch with Chair - 1-2 x daily - 7 x weekly - 1 sets - 3-5 reps - 20-30s hold  Sidelying Open Book Thoracic Rotation with Knee on Foam Roll - 1-2 x daily - 7 x weekly - 1 sets - 10 reps  Thoracic Extension Mobilization on Foam Roll - 1-2 x daily - 7 x weekly - 1 sets - 10 reps  Doorway Pec Stretch at 60 Elevation - 1-2 x daily - 7 x weekly - 1 sets - 10 reps - 10s hold  Scapular Protraction at Wall - 1-2 x daily - 7 x weekly - 2-3 sets - 10 reps  Quadruped Thoracic Rotation with Hand on Neck - 1-2 x daily - 7 x weekly - 1 sets - 10 reps - 5s hold      Therapeutic Exercise and NMR EXR  [x] (13877) Provided verbal/tactile cueing for activities related to strengthening, flexibility, endurance, ROM for improvements in LE, proximal hip, and core control with self care, mobility, lifting, ambulation. [x] (78284) Provided verbal/tactile cueing for activities related to improving balance, coordination, kinesthetic sense, posture, motor skill, proprioception  to assist with LE, proximal hip, and core control in self care, mobility, lifting, ambulation and eccentric single leg control.      NMR and Therapeutic Activities:    [x] (22277 or 40798) Provided verbal/tactile cueing for activities related to improving balance, coordination, kinesthetic sense, posture, motor skill, proprioception and motor activation to allow for proper function of core, proximal hip and LE with self care and ADLs and functional mobility.   [] (38677) Gait Re-education- Provided training and instruction to the patient for proper LE, core and proximal hip recruitment and positioning and eccentric body weight control with ambulation re-education including up and down stairs     Home Exercise Program:    [x] (87352) Reviewed/Progressed HEP activities related to strengthening, flexibility, endurance, ROM of core, proximal hip and LE for functional self-care, mobility, lifting and ambulation/stair navigation   [] (49040)Reviewed/Progressed HEP activities related to improving balance, coordination, kinesthetic sense, posture, motor skill, proprioception of core, proximal hip and LE for self care, mobility, lifting, and ambulation/stair navigation      Manual Treatments:  PROM / STM / Oscillations-Mobs:  G-I, II, III, IV (PA's, Inf., Post.)  [x] (29462) Provided manual therapy to mobilize LE, proximal hip and/or LS spine soft tissue/joints for the purpose of modulating pain, promoting relaxation,  increasing ROM, reducing/eliminating soft tissue swelling/inflammation/restriction, improving soft tissue extensibility and allowing for proper ROM for normal function with self care, mobility, lifting and ambulation. Modalities:     [] GAME READY (VASO)- for significant edema, swelling, pain control. Charges:  Timed Code Treatment Minutes: 55   Total Treatment Minutes: 55      [] EVAL (LOW) 47236 (typically 20 minutes face-to-face)  [] EVAL (MOD) 33555 (typically 30 minutes face-to-face)  [] EVAL (HIGH) 00513 (typically 45 minutes face-to-face)  [] RE-EVAL     [x] PH(98887) x 1    [] DRY NEEDLE 1 OR 2 MUSCLES  [x] NMR (68232) x 1    [] DRY NEEDLE 3+ MUSCLES  [x] Manual (36355) x 2      [] TA (01507) x     [] Mech Traction (99730)  [] ES(attended) (68438)     [] ES (un) (18125):   [] VASO (11466)  [] Other:    GOALS:  Patient stated goal: Squat without knee pain. Be able to lift without back pain. [] Progressing: [] Met: [] Not Met: [] Adjusted    Therapist goals for Patient:   Short Term Goals: To be achieved in: 2 weeks  1. Independent in HEP and progression per patient tolerance, in order to prevent re-injury. [] Progressing: [] Met: [] Not Met: [] Adjusted  2. Patient will have a decrease in pain to facilitate improvement in movement, function, and ADLs as indicated by Functional Deficits. [] Progressing: [] Met: [] Not Met: [] Adjusted    Long Term Goals:  To be achieved in: 4-6 weeks  1. Disability index score of 10% or less for the LEFS to assist with reaching prior level of function. [] Progressing: [] Met: [] Not Met: [] Adjusted  2. Disability index score of 5% or less for the LIBBY to assist with reaching prior level of function. [] Progressing: [] Met: [] Not Met: [] Adjusted  3. Patient will be able to walk >2 miles around campus without R knee symptoms or restriction. [] Progressing: [] Met: [] Not Met: [] Adjusted  4. Patient will be able squat under load without increased R knee symptoms or restriction. [] Progressing: [] Met: [] Not Met: [] Adjusted  5. Patient will be able to dead lift under load without increased R knee symptoms or restriction. [] Progressing: [] Met: [] Not Met: [] Adjusted     ASSESSMENT:  Patient still having occasional R lateral knee pain, but it is more posterior knee now rather than right at fibular head primarily with prolonged standing and walking. Fibular head pain still reproduced with direct pressure such as when kneeling on R knee. Continued all manual techniques addressing CT junction, T/S, and even lumbar mobility limitations today. Mobility restrictions in spine do seem to be improving. Not as tight and stiff today, although patient still noting consistent feelings of tightness in his low back likely due to fatigue from poor loading pattern in scapular musculature and in gluts. Increased focus on scapular motor control via serratus activation tasks. These were very challenging for patient. He likes to utilize extension at T/L junction and in lumbar spine rather than utilize low trap, middle trap, and serratus for improved scapular control likely contributing to sharp pain in T/L junction with tasks like deadlift or OH pressing.      Return to Play: (if applicable)   []  Stage 1: Intro to Strength   []  Stage 2: Return to Run and Strength   []  Stage 3: Return to Jump and Strength   []  Stage 4: Dynamic Strength and Agility   [] Stage 5: Sport Specific Training     []  Ready to Return to Play, Meets All Above Stages   []  Not Ready for Return to Sports   Comments:            Treatment/Activity Tolerance:  [x] Patient tolerated treatment well [] Patient limited by fatique  [] Patient limited by pain  [] Patient limited by other medical complications  [] Other:     Overall Progression Towards Functional goals/ Treatment Progress Update:  [x] Patient is progressing as expected towards functional goals listed. [] Progression is slowed due to complexities/Impairments listed. [] Progression has been slowed due to co-morbidities. [] Plan just implemented, too soon to assess goals progression <30days   [] Goals require adjustment due to lack of progress  [] Patient is not progressing as expected and requires additional follow up with physician  [] Other    Prognosis for POC: [x] Good [] Fair  [] Poor    Patient requires continued skilled intervention: [x] Yes  [] No        PLAN: See eval  [x] Continue per plan of care [] Alter current plan (see comments)  [] Plan of care initiated [] Hold pending MD visit [] Discharge    Electronically signed by: Echo Malave, PT, DPT, MS, SCS    Note: If patient does not return for scheduled/recommended follow up visits, this note will serve as a discharge from care along with the most recent update on progress.

## 2022-04-07 ENCOUNTER — HOSPITAL ENCOUNTER (OUTPATIENT)
Dept: PHYSICAL THERAPY | Age: 22
Setting detail: THERAPIES SERIES
Discharge: HOME | End: 2022-04-07
Payer: COMMERCIAL

## 2022-04-07 PROCEDURE — 97112 NEUROMUSCULAR REEDUCATION: CPT

## 2022-04-07 PROCEDURE — 97140 MANUAL THERAPY 1/> REGIONS: CPT

## 2022-04-07 PROCEDURE — 97110 THERAPEUTIC EXERCISES: CPT

## 2022-04-07 PROCEDURE — 97530 THERAPEUTIC ACTIVITIES: CPT

## 2022-04-07 NOTE — FLOWSHEET NOTE
[place] [address] [facility]  Phone: [phone] Fax: [phone]    Physical Therapy Treatment Note/ Progress Report:     Date:  2022    Patient Name:  Aishwarya Bentley  \"Darek\" :  2000  MRN: [id number]  Restrictions/Precautions:    Medical/Treatment Diagnosis Information:  ·  M25.561 R knee pain; M54.9 Back pain, unspecified location, laterality, and chronicity  ·  M25.561 R knee pain; M54.9 Back pain, unspecified location, laterality, and chronicity  Insurance/Certification information:   Mauricio Kelley   Physician Information:   Dr. Ángel Caldwell of care signed (Y/N):     Date of Patient follow up with Physician:      Progress Report: []  Yes  [x]  No     Date Range for reporting period:  Beginning:  3/15/22  Endin days or 10 visits    Progress report due (10 Rx/or 30 days whichever is less): 6/10/45     Recertification due (POC duration/ or 90 days whichever is less): 4/15/22     Visit # Insurance Allowable Auth Needed   5 BCBS - 75 visits []Yes   [x]No     Latex Allergy:  [x]NO      []YES  Preferred Language for Healthcare:   [x]English       []other:  Functional Scale: 29% disability - LEFS (57/80); 12% disability - LIBBY (6/50)  Date assessed:  3/15/22    Pain level:  R knee:  1/10 at start of session; 3/10 at worst.  Low back:  0/10 at start of session; 3/10 at worst.      SUBJECTIVE:  Low back hasn't felt as tight as frequently. Did start back to some lifting activities yesterday so that he can get back in shape. Really needs to be running so that he can pass a physical fitness test this month. Pain on fibular head seems less frequent also. Mainly feeling it towards the end of the day now, but inconsistent.     OBJECTIVE: See eval   Observation:    Test measurements:      RESTRICTIONS/PRECAUTIONS:    Exercises/Interventions:     Therapeutic Ex (16456)  Sets/sec Reps Notes/CUES   Hip ER stretch on mat - standing 20-30s 3-5 Bilat; HEP   [place] stretch 20-30s 3-5 Bilat; HEP   Half kneeling rectus femoris stretch w/strap 20-30s 3-5 Bilat; HEP   Hamstring stretch in doorway  2-3 min LLLD stretch; bilat; HEP   TS self mobs w/foam roller 1 6-12 Each segment   Sidelying T/L rotation stretch 1 10 bilat   Quadruped T/S rotations 1 10 Cues to drive through stance UE for SA act   T/S ext over chair back   Attempted, but patient extends only at painful segment resulting in increased pain, so HELD. Subscap/lat stretch on table 10s 10 bilat   Sleeper stretch 10s 10 On R   Doorway ER stretch - 60 deg elevation, low angle 10s 10 bilat         SA wall rolls on bolster - orange loop around wrists 2 10                Manual Intervention (65021)      Hip mobs - inferior glide, inferior lateral glide, IR/ER mobs bilat 8 min W/mulligan belt; Gr. II-III   T/S PA mobs  5 min    STMob to T/S paraspinals bilat  5 min    Prone/supine T/S manip  3 min Gr. V   CT junction manip  2 min Gr. V   Sidelying lumbar roll manip  3 min Bilat; Gr. V               NMR re-education (26200)   CUES NEEDED   Flying squirrel - glut act 10s 10 with knee lift   Half kneeling glut act - on airex 2  20s 10  5 Chops on L  Holds on R - very challenging to chop   Mod. RDL/hip airplanes - back LE supported on wall, hand support in front 1 10 Bilat; Cues for glut act; Really struggles to actively utilize R glut   Ecc KB kickstand - 7.5# KBs (red) 1 10 bilat   Glut step ups on 10\" - slosh tube on back - 8# 1 8 bilat         Push up plus on wall 2 10 Motor control focus                           Therapeutic Activity (84190)      Alter G TM walk/jog - 80% BW 2 min warm up walk 1 min jog/walk x5 intervals  Medium shorts (light blue)                                   Access Code: [email or web address]  URL: [email or web address]  Date: 03/15/2022  Prepared by: Maurisio Hannon      Exercises  Standing Hip ER Stretch at Table - 1-2 x daily - 7 x weekly - 1 sets - 3-5 reps - 20-30s hold  Hip External Rotation Stretch - 1-2 x daily - 7 x weekly - 1 sets - 3-5 reps - 20-30s hold  Supine Hamstring Stretch with Doorway - 1-2 x daily - 7 x weekly - 1 sets - 3-5 reps - 20-30s hold  Half Kneeling Hip Flexor Stretch with Chair - 1-2 x daily - 7 x weekly - 1 sets - 3-5 reps - 20-30s hold  Sidelying Open Book Thoracic Rotation with Knee on Foam Roll - 1-2 x daily - 7 x weekly - 1 sets - 10 reps  Thoracic Extension Mobilization on Foam Roll - 1-2 x daily - 7 x weekly - 1 sets - 10 reps  Doorway Pec Stretch at 60 Elevation - 1-2 x daily - 7 x weekly - 1 sets - 10 reps - 10s hold  Scapular Protraction at Wall - 1-2 x daily - 7 x weekly - 2-3 sets - 10 reps  Quadruped Thoracic Rotation with Hand on Neck - 1-2 x daily - 7 x weekly - 1 sets - 10 reps - 5s hold      Therapeutic Exercise and NMR EXR  [x] (37135) Provided verbal/tactile cueing for activities related to strengthening, flexibility, endurance, ROM for improvements in LE, proximal hip, and core control with self care, mobility, lifting, ambulation. [x] (89078) Provided verbal/tactile cueing for activities related to improving balance, coordination, kinesthetic sense, posture, motor skill, proprioception  to assist with LE, proximal hip, and core control in self care, mobility, lifting, ambulation and eccentric single leg control.      NMR and Therapeutic Activities:    [x] (25622 or 49882) Provided verbal/tactile cueing for activities related to improving balance, coordination, kinesthetic sense, posture, motor skill, proprioception and motor activation to allow for proper function of core, proximal hip and LE with self care and ADLs and functional mobility.   [] (82305) Gait Re-education- Provided training and instruction to the patient for proper LE, core and proximal hip recruitment and positioning and eccentric body weight control with ambulation re-education including up and down stairs     Home Exercise Program:    [x] (82884) Reviewed/Progressed HEP activities related to strengthening, flexibility, endurance, ROM of core, proximal hip and LE for functional self-care, mobility, lifting and ambulation/stair navigation   [] (12427)Reviewed/Progressed HEP activities related to improving balance, coordination, kinesthetic sense, posture, motor skill, proprioception of core, proximal hip and LE for self care, mobility, lifting, and ambulation/stair navigation      Manual Treatments:  PROM / STM / Oscillations-Mobs:  G-I, II, III, IV (PA's, Inf., Post.)  [x] (44736) Provided manual therapy to mobilize LE, proximal hip and/or LS spine soft tissue/joints for the purpose of modulating pain, promoting relaxation,  increasing ROM, reducing/eliminating soft tissue swelling/inflammation/restriction, improving soft tissue extensibility and allowing for proper ROM for normal function with self care, mobility, lifting and ambulation. Modalities:     [] GAME READY (VASO)- for significant edema, swelling, pain control. Charges:  Timed Code Treatment Minutes: 70   Total Treatment Minutes: 70      [] EVAL (LOW) 34471 (typically 20 minutes face-to-face)  [] EVAL (MOD) 70284 (typically 30 minutes face-to-face)  [] EVAL (HIGH) 34578 (typically 45 minutes face-to-face)  [] RE-EVAL     [x] YG(86452) x 1    [] DRY NEEDLE 1 OR 2 MUSCLES  [x] NMR (40072) x 1    [] DRY NEEDLE 3+ MUSCLES  [x] Manual (58411) x 2      [x] TA (42573) x 1    [] Mech Traction (18639)  [] ES(attended) (98401)     [] ES (un) (65197):   [] VASO (86679)  [] Other:    GOALS:  Patient stated goal: Squat without knee pain. Be able to lift without back pain. [] Progressing: [] Met: [] Not Met: [] Adjusted    Therapist goals for Patient:   Short Term Goals: To be achieved in: 2 weeks  1. Independent in HEP and progression per patient tolerance, in order to prevent re-injury. [] Progressing: [] Met: [] Not Met: [] Adjusted  2.  Patient will have a decrease in pain to facilitate improvement in movement, function, and ADLs as indicated by Functional Deficits. [] Progressing: [] Met: [] Not Met: [] Adjusted    Long Term Goals: To be achieved in: 4-6 weeks  1. Disability index score of 10% or less for the LEFS to assist with reaching prior level of function. [] Progressing: [] Met: [] Not Met: [] Adjusted  2. Disability index score of 5% or less for the LIBBY to assist with reaching prior level of function. [] Progressing: [] Met: [] Not Met: [] Adjusted  3. Patient will be able to walk >2 miles around campus without R knee symptoms or restriction. [] Progressing: [] Met: [] Not Met: [] Adjusted  4. Patient will be able squat under load without increased R knee symptoms or restriction. [] Progressing: [] Met: [] Not Met: [] Adjusted  5. Patient will be able to dead lift under load without increased R knee symptoms or restriction. [] Progressing: [] Met: [] Not Met: [] Adjusted     ASSESSMENT:  Fibular head pain seems to be less frequent. Fibular head pain now only onset towards the end of the day, but not consistent. Continued all manual techniques addressing CT junction, T/S, and even lumbar mobility limitations today. Mobility restrictions in spine do seem to be improving. Not as tight and stiff today in lumbar spine and CT junction, but mid T/S still very restricted. Low back stiffness/tightness seems improved, less frequent per patient, but does still happen occasionally likely due to fatigue from poor loading pattern in scapular musculature and in gluts. Progressed glut activation/firing tasks today. Fatigued very quickly with these activities bilaterally. Continued scapular motor control progression via serratus activation tasks.  Still utilizing extension at T/L junction and in lumbar spine rather than using low trap, middle trap, and serratus for improved scapular control in OH positions likely contributing to sharp pain in T/L junction with tasks like strict press, deadlift or other OH pressing tasks. Initiated Alter G TM walk/jog interval running today to start getting cardiovascular fitness back as patient has to be able to pass a physical fitness test soon. Return to Play: (if applicable)   []  Stage 1: Intro to Strength   []  Stage 2: Return to Run and Strength   []  Stage 3: Return to Jump and Strength   []  Stage 4: Dynamic Strength and Agility   []  Stage 5: Sport Specific Training     []  Ready to Return to Play, Meets All Above Stages   []  Not Ready for Return to Sports   Comments:            Treatment/Activity Tolerance:  [x] Patient tolerated treatment well [] Patient limited by fatique  [] Patient limited by pain  [] Patient limited by other medical complications  [] Other:     Overall Progression Towards Functional goals/ Treatment Progress Update:  [x] Patient is progressing as expected towards functional goals listed. [] Progression is slowed due to complexities/Impairments listed. [] Progression has been slowed due to co-morbidities. [] Plan just implemented, too soon to assess goals progression <30days   [] Goals require adjustment due to lack of progress  [] Patient is not progressing as expected and requires additional follow up with physician  [] Other    Prognosis for POC: [x] Good [] Fair  [] Poor    Patient requires continued skilled intervention: [x] Yes  [] No        PLAN: See eval  [x] Continue per plan of care [] Alter current plan (see comments)  [] Plan of care initiated [] Hold pending MD visit [] Discharge    Electronically signed by: Isiah Salgado, PT, DPT, MS, SCS    Note: If patient does not return for scheduled/recommended follow up visits, this note will serve as a discharge from care along with the most recent update on progress.

## 2022-04-12 ENCOUNTER — HOSPITAL ENCOUNTER (OUTPATIENT)
Dept: PHYSICAL THERAPY | Age: 22
Setting detail: THERAPIES SERIES
Discharge: HOME | End: 2022-04-12
Payer: COMMERCIAL

## 2022-04-12 PROCEDURE — 97140 MANUAL THERAPY 1/> REGIONS: CPT

## 2022-04-12 PROCEDURE — 97112 NEUROMUSCULAR REEDUCATION: CPT

## 2022-04-12 PROCEDURE — 97110 THERAPEUTIC EXERCISES: CPT

## 2022-04-12 NOTE — FLOWSHEET NOTE
[place] [address] [facility]  Phone: [phone] Fax: [phone]    Physical Therapy Treatment Note/ Progress Report:     Date:  2022    Patient Name:  Trevin Black  \"Darek\" :  2000  MRN: [id number]  Restrictions/Precautions:    Medical/Treatment Diagnosis Information:  ·  M25.561 R knee pain; M54.9 Back pain, unspecified location, laterality, and chronicity  ·  M25.561 R knee pain; M54.9 Back pain, unspecified location, laterality, and chronicity  Insurance/Certification information:   Trace Arreaga   Physician Information:   Dr. Celine Wilson of care signed (Y/N):     Date of Patient follow up with Physician:      Progress Report: []  Yes  [x]  No     Date Range for reporting period:  Beginning:  3/15/22  Endin days or 10 visits    Progress report due (10 Rx/or 30 days whichever is less):      Recertification due (POC duration/ or 90 days whichever is less): 4/15/22     Visit # Insurance Allowable Auth Needed   6 BCBS - 75 visits []Yes   [x]No     Latex Allergy:  [x]NO      []YES  Preferred Language for Healthcare:   [x]English       []other:  Functional Scale: 29% disability - LEFS (57/80); 12% disability - LIBBY (6/50)  Date assessed:  3/15/22    Pain level:  R knee:  0/10 at start of session; 3/10 at worst.  Low back:  0/10 at start of session; 3/10 at worst.      SUBJECTIVE:  Felt good after previous session. Was even able to run on his own over the weekend. Because he can run now, he is supposed to take his PT test in the next week or so. More worried about his cardio fitness. Has not run at all for over a month. Denies R knee pain with running. Also has to be able to ruck with a 55# backpack this week. Not sure how his low back is going to tolerate that, but less worried about that than his cardio fitness with the 1.5 mile PT test run.      OBJECTIVE: See eval   Observation:    Test measurements: RESTRICTIONS/PRECAUTIONS:    Exercises/Interventions:     Therapeutic Ex (22311)  Sets/sec Reps Notes/CUES   Hip ER stretch on mat - standing 20-30s 3-5 Bilat; HEP   [place] stretch 20-30s 3-5 Bilat; HEP   Half kneeling rectus femoris stretch w/strap 20-30s 3-5 Bilat; HEP   Hamstring stretch in doorway  2-3 min LLLD stretch; bilat; HEP   TS self mobs w/foam roller 1 6-12 Each segment   Sidelying T/L rotation stretch 1 10 bilat   Quadruped T/S rotations 1 10 Cues to drive through stance UE for SA act   T/S ext over chair back   Attempted, but patient extends only at painful segment resulting in increased pain, so HELD. Subscap/lat stretch on table 10s 10 bilat   Sleeper stretch 10s 10 On R   Doorway ER stretch - 60 deg elevation, low angle 10s 10 bilat         Sidelying ER - 3# 1 15 bilat   Sidelying SA punch 2 10 Towel roll under elbow; bilat   TB low row - blue 2 10    SA wall rolls on bolster  2 10                Manual Intervention (52516)      Hip mobs - inferior glide, inferior lateral glide, IR/ER mobs, anterior glide w/IR/ER - prone bilat 7 min W/mulligan belt; Gr. II-III   T/S PA mobs  4 min    STMob to T/S paraspinals bilat  3 min    Prone/supine T/S manip  3 min Gr. V   CT junction manip  2 min Gr. V   Sidelying lumbar roll manip  3 min Bilat; Gr. V               NMR re-education (61695)   CUES NEEDED   Flying squirrel - glut act 10s 10 with knee lift   Half kneeling glut act - on airex 2  20s 10  5 Chops on L  Holds on R - very challenging to chop   Mod.  RDL/hip airplanes - back LE supported on wall, hand support in front 1 10 Bilat; Cues for glut act; Really struggles to actively utilize R glut   Ecc KB kickstand - 7.5# KBs (red) 1 10 bilat   Glut step ups on 10\" - slosh tube on back - 8# 1 8 bilat         Prone T & Y - on floor 5s 10 bilat   Push up plus on wall 2 10 Motor control focus   TB ER to uppercut - Green 1 10 bilat                     Therapeutic Activity (77065)      Alter G TM walk/jog - 80% BW 2 min warm up walk 1 min jog/walk x5 intervals  Medium shorts (light blue)                                   Access Code: [email or web address]  URL: [email or web address]  Date: 03/15/2022  Prepared by: Cristina Holder      Exercises  Standing Hip ER Stretch at Table - 1-2 x daily - 7 x weekly - 1 sets - 3-5 reps - 20-30s hold  Hip External Rotation Stretch - 1-2 x daily - 7 x weekly - 1 sets - 3-5 reps - 20-30s hold  Supine Hamstring Stretch with Doorway - 1-2 x daily - 7 x weekly - 1 sets - 3-5 reps - 20-30s hold  Half Kneeling Hip Flexor Stretch with Chair - 1-2 x daily - 7 x weekly - 1 sets - 3-5 reps - 20-30s hold  Sidelying Open Book Thoracic Rotation with Knee on Foam Roll - 1-2 x daily - 7 x weekly - 1 sets - 10 reps  Thoracic Extension Mobilization on Foam Roll - 1-2 x daily - 7 x weekly - 1 sets - 10 reps  Doorway Pec Stretch at 60 Elevation - 1-2 x daily - 7 x weekly - 1 sets - 10 reps - 10s hold  Scapular Protraction at Wall - 1-2 x daily - 7 x weekly - 2-3 sets - 10 reps  Quadruped Thoracic Rotation with Hand on Neck - 1-2 x daily - 7 x weekly - 1 sets - 10 reps - 5s hold      Therapeutic Exercise and NMR EXR  [x] (33026) Provided verbal/tactile cueing for activities related to strengthening, flexibility, endurance, ROM for improvements in LE, proximal hip, and core control with self care, mobility, lifting, ambulation. [x] (73655) Provided verbal/tactile cueing for activities related to improving balance, coordination, kinesthetic sense, posture, motor skill, proprioception  to assist with LE, proximal hip, and core control in self care, mobility, lifting, ambulation and eccentric single leg control.      NMR and Therapeutic Activities:    [x] (53327 or 25600) Provided verbal/tactile cueing for activities related to improving balance, coordination, kinesthetic sense, posture, motor skill, proprioception and motor activation to allow for proper function of core, proximal hip and LE with self care and ADLs and functional mobility.   [] (38192) Gait Re-education- Provided training and instruction to the patient for proper LE, core and proximal hip recruitment and positioning and eccentric body weight control with ambulation re-education including up and down stairs     Home Exercise Program:    [x] (46657) Reviewed/Progressed HEP activities related to strengthening, flexibility, endurance, ROM of core, proximal hip and LE for functional self-care, mobility, lifting and ambulation/stair navigation   [] (56330)Reviewed/Progressed HEP activities related to improving balance, coordination, kinesthetic sense, posture, motor skill, proprioception of core, proximal hip and LE for self care, mobility, lifting, and ambulation/stair navigation      Manual Treatments:  PROM / STM / Oscillations-Mobs:  G-I, II, III, IV (PA's, Inf., Post.)  [x] (56341) Provided manual therapy to mobilize LE, proximal hip and/or LS spine soft tissue/joints for the purpose of modulating pain, promoting relaxation,  increasing ROM, reducing/eliminating soft tissue swelling/inflammation/restriction, improving soft tissue extensibility and allowing for proper ROM for normal function with self care, mobility, lifting and ambulation. Modalities:     [] GAME READY (VASO)- for significant edema, swelling, pain control. Charges:  Timed Code Treatment Minutes: 50   Total Treatment Minutes: 50      [] EVAL (LOW) 83906 (typically 20 minutes face-to-face)  [] EVAL (MOD) 71107 (typically 30 minutes face-to-face)  [] EVAL (HIGH) 84754 (typically 45 minutes face-to-face)  [] RE-EVAL     [x] AF(94560) x 1    [] DRY NEEDLE 1 OR 2 MUSCLES  [x] NMR (60490) x 1    [] DRY NEEDLE 3+ MUSCLES  [x] Manual (00515) x 1      [] TA (13701) x     [] Mech Traction (19671)  [] ES(attended) (77575)     [] ES (un) (78682):   [] VASO (68253)  [] Other:    GOALS:  Patient stated goal: Squat without knee pain. Be able to lift without back pain.    [] Progressing: [] Met: [] Not Met: [] Adjusted    Therapist goals for Patient:   Short Term Goals: To be achieved in: 2 weeks  1. Independent in HEP and progression per patient tolerance, in order to prevent re-injury. [] Progressing: [] Met: [] Not Met: [] Adjusted  2. Patient will have a decrease in pain to facilitate improvement in movement, function, and ADLs as indicated by Functional Deficits. [] Progressing: [] Met: [] Not Met: [] Adjusted    Long Term Goals: To be achieved in: 4-6 weeks  1. Disability index score of 10% or less for the LEFS to assist with reaching prior level of function. [] Progressing: [] Met: [] Not Met: [] Adjusted  2. Disability index score of 5% or less for the LIBBY to assist with reaching prior level of function. [] Progressing: [] Met: [] Not Met: [] Adjusted  3. Patient will be able to walk >2 miles around campus without R knee symptoms or restriction. [] Progressing: [] Met: [] Not Met: [] Adjusted  4. Patient will be able squat under load without increased R knee symptoms or restriction. [] Progressing: [] Met: [] Not Met: [] Adjusted  5. Patient will be able to dead lift under load without increased R knee symptoms or restriction. [] Progressing: [] Met: [] Not Met: [] Adjusted     ASSESSMENT:  Now dividing sessions up so that one session focuses more on upper quarter contributors and one on lower chain glut activation and motor control contributors to sxs to maximize strength and control in each of these areas. Continued all manual techniques addressing CT junction, T/S, lumbar, and hip mobility limitations. Mobility restrictions in spine do seem to be improving. Not as tight and stiff in CT junction and upper T/S, but mid T/S still very restricted. Patient also tends to over utilize lats through [address] depression instead of scap retracting via low trap, middle trap, and rhomboid activation likely further encouraging extension at T/L junction with performance of OH pressing movements.       Return to Play: (if applicable)   []  Stage 1: Intro to Strength   []  Stage 2: Return to Run and Strength   []  Stage 3: Return to Jump and Strength   []  Stage 4: Dynamic Strength and Agility   []  Stage 5: Sport Specific Training     []  Ready to Return to Play, Meets All Above Stages   []  Not Ready for Return to Sports   Comments:            Treatment/Activity Tolerance:  [x] Patient tolerated treatment well [] Patient limited by fatique  [] Patient limited by pain  [] Patient limited by other medical complications  [] Other:     Overall Progression Towards Functional goals/ Treatment Progress Update:  [x] Patient is progressing as expected towards functional goals listed. [] Progression is slowed due to complexities/Impairments listed. [] Progression has been slowed due to co-morbidities. [] Plan just implemented, too soon to assess goals progression <30days   [] Goals require adjustment due to lack of progress  [] Patient is not progressing as expected and requires additional follow up with physician  [] Other    Prognosis for POC: [x] Good [] Fair  [] Poor    Patient requires continued skilled intervention: [x] Yes  [] No        PLAN: See eval  [x] Continue per plan of care [] Alter current plan (see comments)  [] Plan of care initiated [] Hold pending MD visit [] Discharge    Electronically signed by: Amadou Chowdhury, PT, DPT, MS, SCS    Note: If patient does not return for scheduled/recommended follow up visits, this note will serve as a discharge from care along with the most recent update on progress.

## 2022-04-14 ENCOUNTER — HOSPITAL ENCOUNTER (OUTPATIENT)
Dept: PHYSICAL THERAPY | Age: 22
Setting detail: THERAPIES SERIES
Discharge: HOME | End: 2022-04-14
Payer: COMMERCIAL

## 2022-04-14 PROCEDURE — 97140 MANUAL THERAPY 1/> REGIONS: CPT

## 2022-04-14 PROCEDURE — 97112 NEUROMUSCULAR REEDUCATION: CPT

## 2022-04-14 PROCEDURE — 97110 THERAPEUTIC EXERCISES: CPT

## 2022-04-14 NOTE — FLOWSHEET NOTE
[place] [address] [facility]  Phone: [phone] Fax: [phone]    Physical Therapy Treatment Note/ Progress Report:     Date:  2022    Patient Name:  Elva Douglas  \"Darek\" :  2000  MRN: [id number]  Restrictions/Precautions:    Medical/Treatment Diagnosis Information:  ·  M25.561 R knee pain; M54.9 Back pain, unspecified location, laterality, and chronicity  ·  M25.561 R knee pain; M54.9 Back pain, unspecified location, laterality, and chronicity  Insurance/Certification information:   Eugenia Salvador   Physician Information:   Dr. Vanna Basurto of care signed (Y/N):     Date of Patient follow up with Physician:      Progress Report: []  Yes  [x]  No     Date Range for reporting period:  Beginning:  3/15/22  Endin days or 10 visits    Progress report due (10 Rx/or 30 days whichever is less):      Recertification due (POC duration/ or 90 days whichever is less): 4/15/22     Visit # Insurance Allowable Auth Needed   7 BCBS - 75 visits []Yes   [x]No     Latex Allergy:  [x]NO      []YES  Preferred Language for Healthcare:   [x]English       []other:  Functional Scale: 29% disability - LEFS (57/80); 12% disability - LIBBY (6/50)  Date assessed:  3/15/22    Pain level:  R knee:  0/10 at start of session; 3/10 at worst.  Low back:  0/10 at start of session; 3/10 at worst.      SUBJECTIVE:  Rucked 3 miles on Tuesday with a 55# backpack. Did surprisingly well for not having worked out over the past month or so. Did start to have some mild, but tolerable fibular head discomfort on the R about midway through the ruck. Low back did great during, but was sore afterwards likely from carrying the ruck on his back for so long. Feeling generally tight and sore today. Supposed to run 1.5 mile PT test run tomorrow.      OBJECTIVE: See eval   Observation:    Test measurements: RESTRICTIONS/PRECAUTIONS:    Exercises/Interventions:     Therapeutic Ex (29404)  Sets/sec Reps Notes/CUES   Hip ER stretch on mat - standing 20-30s 3-5 Bilat; HEP   [place] stretch 20-30s 3-5 Bilat; HEP   Half kneeling rectus femoris stretch w/strap 20-30s 3-5 Bilat; HEP   Hamstring stretch in doorway  2-3 min LLLD stretch; bilat; HEP   TS self mobs w/foam roller 1 6-12 Each segment   Sidelying T/L rotation stretch 1 10 bilat   Quadruped T/S rotations 1 10 Cues to drive through stance UE for SA act   T/S ext over chair back   Attempted, but patient extends only at painful segment resulting in increased pain, so HELD. Subscap/lat stretch on table 10s 10 bilat   Sleeper stretch 10s 10 On R   Doorway ER stretch - 60 deg elevation, low angle 10s 10 bilat         Sidelying ER - 3# 1 15 bilat   Sidelying SA punch 2 10 Towel roll under elbow; bilat   TB low row - blue 2 10    SA wall rolls on bolster  2 10                Manual Intervention (35135)      Hip mobs - inferior glide, inferior lateral glide, IR/ER mobs, anterior glide w/IR/ER - prone bilat 5 min W/mulligan belt; Gr. II-III   T/S PA mobs  5 min    IASTM/STMob to T/S paraspinals bilat  7 min bilat   Prone/supine T/S manip  3 min Gr. V   CT junction manip  2 min Gr. V   Sidelying lumbar roll manip  3 min Bilat; Gr. V               NMR re-education (19912)   CUES NEEDED   Flying squirrel - glut act 10s 10 with knee lift   Half kneeling glut act - on airex 2  20s 10  5 Chops on L  Holds on R - very challenging to chop   Mod.  RDL/hip airplanes - back LE supported on wall, hand support in front 1 10 Bilat; Cues for glut act; Really struggles to actively utilize R glut   Ecc KB kickstand - 7.5# KBs (red) 1 10 bilat   Glut step ups on 10\" - slosh tube on back - 8# 1 8 bilat         Prone T & Y - on floor 5s 10 bilat   Push up plus on wall 2 10 Motor control focus   TB ER to uppercut - Green 1 10 bilat                     Therapeutic Activity (90415)      Ped[place] TM walk/jog - 80% BW 2 min warm up walk 1 min jog/walk x5 intervals  Medium shorts (light blue)                                   Access Code: [email or web address]  URL: [email or web address]  Date: 03/15/2022  Prepared by: Dimitry Sheikh      Exercises  Standing Hip ER Stretch at Table - 1-2 x daily - 7 x weekly - 1 sets - 3-5 reps - 20-30s hold  Hip External Rotation Stretch - 1-2 x daily - 7 x weekly - 1 sets - 3-5 reps - 20-30s hold  Supine Hamstring Stretch with Doorway - 1-2 x daily - 7 x weekly - 1 sets - 3-5 reps - 20-30s hold  Half Kneeling Hip Flexor Stretch with Chair - 1-2 x daily - 7 x weekly - 1 sets - 3-5 reps - 20-30s hold  Sidelying Open Book Thoracic Rotation with Knee on Foam Roll - 1-2 x daily - 7 x weekly - 1 sets - 10 reps  Thoracic Extension Mobilization on Foam Roll - 1-2 x daily - 7 x weekly - 1 sets - 10 reps  Doorway Pec Stretch at 60 Elevation - 1-2 x daily - 7 x weekly - 1 sets - 10 reps - 10s hold  Scapular Protraction at Wall - 1-2 x daily - 7 x weekly - 2-3 sets - 10 reps  Quadruped Thoracic Rotation with Hand on Neck - 1-2 x daily - 7 x weekly - 1 sets - 10 reps - 5s hold      Therapeutic Exercise and NMR EXR  [x] (02863) Provided verbal/tactile cueing for activities related to strengthening, flexibility, endurance, ROM for improvements in LE, proximal hip, and core control with self care, mobility, lifting, ambulation. [x] (91665) Provided verbal/tactile cueing for activities related to improving balance, coordination, kinesthetic sense, posture, motor skill, proprioception  to assist with LE, proximal hip, and core control in self care, mobility, lifting, ambulation and eccentric single leg control.      NMR and Therapeutic Activities:    [x] (26439 or 88507) Provided verbal/tactile cueing for activities related to improving balance, coordination, kinesthetic sense, posture, motor skill, proprioception and motor activation to allow for proper function of core, proximal hip and LE with self care and ADLs and functional mobility.   [] (77258) Gait Re-education- Provided training and instruction to the patient for proper LE, core and proximal hip recruitment and positioning and eccentric body weight control with ambulation re-education including up and down stairs     Home Exercise Program:    [x] (83919) Reviewed/Progressed HEP activities related to strengthening, flexibility, endurance, ROM of core, proximal hip and LE for functional self-care, mobility, lifting and ambulation/stair navigation   [] (73479)Reviewed/Progressed HEP activities related to improving balance, coordination, kinesthetic sense, posture, motor skill, proprioception of core, proximal hip and LE for self care, mobility, lifting, and ambulation/stair navigation      Manual Treatments:  PROM / STM / Oscillations-Mobs:  G-I, II, III, IV (PA's, Inf., Post.)  [x] (19251) Provided manual therapy to mobilize LE, proximal hip and/or LS spine soft tissue/joints for the purpose of modulating pain, promoting relaxation,  increasing ROM, reducing/eliminating soft tissue swelling/inflammation/restriction, improving soft tissue extensibility and allowing for proper ROM for normal function with self care, mobility, lifting and ambulation. Modalities:     [] GAME READY (VASO)- for significant edema, swelling, pain control. Charges:  Timed Code Treatment Minutes: 55   Total Treatment Minutes: 60      [] EVAL (LOW) 12534 (typically 20 minutes face-to-face)  [] EVAL (MOD) 00294 (typically 30 minutes face-to-face)  [] EVAL (HIGH) 96551 (typically 45 minutes face-to-face)  [] RE-EVAL     [x] WF(08562) x 1    [] DRY NEEDLE 1 OR 2 MUSCLES  [x] NMR (63093) x 1    [] DRY NEEDLE 3+ MUSCLES  [x] Manual (18463) x 2      [] TA (45832) x     [] Mech Traction (31244)  [] ES(attended) (08984)     [] ES (un) (48089):   [] VASO (13391)  [] Other:    GOALS:  Patient stated goal: Squat without knee pain. Be able to lift without back pain.    [] Progressing: [] Met: [] Not Met: [] Adjusted    Therapist goals for Patient:   Short Term Goals: To be achieved in: 2 weeks  1. Independent in HEP and progression per patient tolerance, in order to prevent re-injury. [] Progressing: [] Met: [] Not Met: [] Adjusted  2. Patient will have a decrease in pain to facilitate improvement in movement, function, and ADLs as indicated by Functional Deficits. [] Progressing: [] Met: [] Not Met: [] Adjusted    Long Term Goals: To be achieved in: 4-6 weeks  1. Disability index score of 10% or less for the LEFS to assist with reaching prior level of function. [] Progressing: [] Met: [] Not Met: [] Adjusted  2. Disability index score of 5% or less for the LIBBY to assist with reaching prior level of function. [] Progressing: [] Met: [] Not Met: [] Adjusted  3. Patient will be able to walk >2 miles around campus without R knee symptoms or restriction. [] Progressing: [] Met: [] Not Met: [] Adjusted  4. Patient will be able squat under load without increased R knee symptoms or restriction. [] Progressing: [] Met: [] Not Met: [] Adjusted  5. Patient will be able to dead lift under load without increased R knee symptoms or restriction. [] Progressing: [] Met: [] Not Met: [] Adjusted     ASSESSMENT:  Patient had a 3 mile run with a 55# ruck backpack for [employer] after PT session on Tuesday. Patient generally sore and tight today. Primary focus and emphasis on manual mobilizations, soft tissue, and self stretching today to help relieve soreness and tightness and improve tissue recovery following recent ruck. Plus, patient is supposed to take his 1.5 mile PT test run tomorrow.      Return to Play: (if applicable)   []  Stage 1: Intro to Strength   []  Stage 2: Return to Run and Strength   []  Stage 3: Return to Jump and Strength   []  Stage 4: Dynamic Strength and Agility   []  Stage 5: Sport Specific Training     []  Ready to Return to Play, Meets All Above Stages   []  Not Ready for Return to Sports   Comments:            Treatment/Activity Tolerance:  [x] Patient tolerated treatment well [] Patient limited by fatique  [] Patient limited by pain  [] Patient limited by other medical complications  [] Other:     Overall Progression Towards Functional goals/ Treatment Progress Update:  [x] Patient is progressing as expected towards functional goals listed. [] Progression is slowed due to complexities/Impairments listed. [] Progression has been slowed due to co-morbidities. [] Plan just implemented, too soon to assess goals progression <30days   [] Goals require adjustment due to lack of progress  [] Patient is not progressing as expected and requires additional follow up with physician  [] Other    Prognosis for POC: [x] Good [] Fair  [] Poor    Patient requires continued skilled intervention: [x] Yes  [] No        PLAN: See eval  [x] Continue per plan of care [] Alter current plan (see comments)  [] Plan of care initiated [] Hold pending MD visit [] Discharge    Electronically signed by: Neli Jordan, PT, DPT, MS, SCS    Note: If patient does not return for scheduled/recommended follow up visits, this note will serve as a discharge from care along with the most recent update on progress.

## 2022-04-19 ENCOUNTER — HOSPITAL ENCOUNTER (OUTPATIENT)
Dept: PHYSICAL THERAPY | Age: 22
Setting detail: THERAPIES SERIES
Discharge: HOME | End: 2022-04-19
Payer: COMMERCIAL

## 2022-04-19 PROCEDURE — 97112 NEUROMUSCULAR REEDUCATION: CPT

## 2022-04-19 PROCEDURE — 97140 MANUAL THERAPY 1/> REGIONS: CPT

## 2022-04-19 PROCEDURE — 97110 THERAPEUTIC EXERCISES: CPT

## 2022-04-19 NOTE — FLOWSHEET NOTE
[place] [address] [facility]  Phone: [phone] Fax: [phone]    Physical Therapy Treatment Note/ Progress Report:     Date:  2022    Patient Name:  Bushra Lozano  \"Darek\" :  2000  MRN: [id number]  Restrictions/Precautions:    Medical/Treatment Diagnosis Information:  ·  M25.561 R knee pain; M54.9 Back pain, unspecified location, laterality, and chronicity  ·  M25.561 R knee pain; M54.9 Back pain, unspecified location, laterality, and chronicity  Insurance/Certification information:   [facility]   Physician Information:   Dr. Osvaldo Ball of care signed (Y/N):     Date of Patient follow up with Physician:      Progress Report: []  Yes  [x]  No     Date Range for reporting period:  Beginning:  3/15/22  Endin days or 10 visits    Progress report due (10 Rx/or 30 days whichever is less):      Recertification due (POC duration/ or 90 days whichever is less): 4/15/22     Visit # Insurance Allowable Auth Needed   8 [employer]BS - 75 visits []Yes   [x]No     Latex Allergy:  [x]NO      []YES  Preferred Language for Healthcare:   [x]English       []other:  Functional Scale: 29% disability - LEFS (57/80); 12% disability - LIBBY (6/50)  Date assessed:  3/15/22    Pain level:  R knee:  0/10 at start of session; 3/10 at worst.  Low back:  0/10 at start of session; 3/10 at worst.      SUBJECTIVE:  Passed 1.5 mile PT fitness test run last week. Not in cardio shape. That's what killed him. Had no pain with the run.  Did notice pain in the back of the knee with putting his socks and shoes on with his R leg crossed over his L.    OBJECTIVE: See eval   Observation:    Test measurements:      RESTRICTIONS/PRECAUTIONS:    Exercises/Interventions:     Therapeutic Ex (18455)  Sets/sec Reps Notes/CUES   Hip ER stretch on mat - standing 20-30s 3-5 Bilat; HEP   Isaban stretch 20-30s 3-5 Bilat; HEP   Banded hip IR/ER self mobs 1 6-12  Each position [place] pose position  Half kneeling w/self IR w/hand   Half kneeling rectus femoris stretch w/strap 20-30s 3-5 Bilat; HEP   Hamstring stretch in doorway  2-3 min LLLD stretch; bilat; HEP   TS self mobs w/foam roller 1 6-12 Each segment   Sidelying T/L rotation stretch 1 10 bilat   Quadruped T/S rotations 1 10 Cues to drive through stance UE for SA act   T/S ext over chair back   Attempted, but patient extends only at painful segment resulting in increased pain, so HELD. Subscap/lat stretch on table 10s 10 bilat   Sleeper stretch 10s 10 On R   Doorway ER stretch - 60 deg elevation, low angle 10s 10 bilat         Sidelying ER - 3# 1 15 bilat   Sidelying SA punch 2 10 Towel roll under elbow; bilat   TB low row - blue 2 10    SA wall rolls on bolster  2 10                Manual Intervention (30256)      Hip mobs - inferior glide, inferior lateral glide, IR/ER mobs, anterior glide w/IR/ER - prone bilat 5 min W/mulligan belt; Gr. II-III   T/S PA mobs  5 min    IASTM/STMob to T/S paraspinals bilat   bilat   Prone/supine T/S manip  3 min Gr. V   CT junction manip  2 min Gr. V   Sidelying lumbar roll manip  3 min Bilat; Gr. V               NMR re-education (21344)   CUES NEEDED   Flying squirrel - glut act 10s 10 with knee lift   Half kneeling glut act - on airex 2  20s 10  5 Chops on L  Holds on R - very challenging to chop   Prone hip ext - bed flexed 5s 10 Bilat; Cues for glut act, decreased lumbar paraspinals   Houston pose glut act 10s 10 Unable to knee lift at this time, focus on isometric glut act L>R   Standing hip abd - orange loop at ankles 2 10 bilat   Mod.  RDL/hip airplanes - back LE supported on wall, hand support in front 1 10 Bilat; Cues for glut act; Really struggles to actively utilize R glut   Ecc KB kickstand - 7.5# KBs (red) 1 10 bilat   Glut step ups on 10\" - slosh tube on back - 8# 1 8 bilat         Prone T & Y - on floor 5s 10 bilat   Push up plus on wall 2 10 Motor control focus TB ER to uppercut - Green 1 10 bilat                     Therapeutic Activity (16871)      Alter G TM walk/jog - 80% BW 2 min warm up walk 1 min jog/walk x5 intervals  Medium shorts (light blue)                                   Access Code: [email or web address]  URL: [email or web address]  Date: 03/15/2022  Prepared by: Cristina Holder      Exercises  Standing Hip ER Stretch at Table - 1-2 x daily - 7 x weekly - 1 sets - 3-5 reps - 20-30s hold  Hip External Rotation Stretch - 1-2 x daily - 7 x weekly - 1 sets - 3-5 reps - 20-30s hold  Supine Hamstring Stretch with Doorway - 1-2 x daily - 7 x weekly - 1 sets - 3-5 reps - 20-30s hold  Half Kneeling Hip Flexor Stretch with Chair - 1-2 x daily - 7 x weekly - 1 sets - 3-5 reps - 20-30s hold  Sidelying Open Book Thoracic Rotation with Knee on Foam Roll - 1-2 x daily - 7 x weekly - 1 sets - 10 reps  Thoracic Extension Mobilization on Foam Roll - 1-2 x daily - 7 x weekly - 1 sets - 10 reps  Doorway Pec Stretch at 60 Elevation - 1-2 x daily - 7 x weekly - 1 sets - 10 reps - 10s hold  Scapular Protraction at Wall - 1-2 x daily - 7 x weekly - 2-3 sets - 10 reps  Quadruped Thoracic Rotation with Hand on Neck - 1-2 x daily - 7 x weekly - 1 sets - 10 reps - 5s hold      Therapeutic Exercise and NMR EXR  [x] (91925) Provided verbal/tactile cueing for activities related to strengthening, flexibility, endurance, ROM for improvements in LE, proximal hip, and core control with self care, mobility, lifting, ambulation. [x] (71365) Provided verbal/tactile cueing for activities related to improving balance, coordination, kinesthetic sense, posture, motor skill, proprioception  to assist with LE, proximal hip, and core control in self care, mobility, lifting, ambulation and eccentric single leg control.      NMR and Therapeutic Activities:    [x] (85567 or 69884) Provided verbal/tactile cueing for activities related to improving balance, coordination, kinesthetic sense, posture, motor skill, proprioception and motor activation to allow for proper function of core, proximal hip and LE with self care and ADLs and functional mobility.   [] (67234) Gait Re-education- Provided training and instruction to the patient for proper LE, core and proximal hip recruitment and positioning and eccentric body weight control with ambulation re-education including up and down stairs     Home Exercise Program:    [x] (79473) Reviewed/Progressed HEP activities related to strengthening, flexibility, endurance, ROM of core, proximal hip and LE for functional self-care, mobility, lifting and ambulation/stair navigation   [] (13232)Reviewed/Progressed HEP activities related to improving balance, coordination, kinesthetic sense, posture, motor skill, proprioception of core, proximal hip and LE for self care, mobility, lifting, and ambulation/stair navigation      Manual Treatments:  PROM / STM / Oscillations-Mobs:  G-I, II, III, IV (PA's, Inf., Post.)  [x] (68117) Provided manual therapy to mobilize LE, proximal hip and/or LS spine soft tissue/joints for the purpose of modulating pain, promoting relaxation,  increasing ROM, reducing/eliminating soft tissue swelling/inflammation/restriction, improving soft tissue extensibility and allowing for proper ROM for normal function with self care, mobility, lifting and ambulation. Modalities:     [] GAME READY (VASO)- for significant edema, swelling, pain control.      Charges:  Timed Code Treatment Minutes: 52   Total Treatment Minutes: 52      [] EVAL (LOW) 80608 (typically 20 minutes face-to-face)  [] EVAL (MOD) 03657 (typically 30 minutes face-to-face)  [] EVAL (HIGH) 88602 (typically 45 minutes face-to-face)  [] RE-EVAL     [x] FV(93155) x 1    [] DRY NEEDLE 1 OR 2 MUSCLES  [x] NMR (40688) x 1    [] DRY NEEDLE 3+ MUSCLES  [x] Manual (92509) x 1      [] TA (14446) x     [] Mech Traction (05097)  [] ES(attended) (23273)     [] ES (un) (53610):   [] VASO (22947)  [] Other:    GOALS:  Patient stated goal: Squat without knee pain. Be able to lift without back pain. [] Progressing: [] Met: [] Not Met: [] Adjusted    Therapist goals for Patient:   Short Term Goals: To be achieved in: 2 weeks  1. Independent in HEP and progression per patient tolerance, in order to prevent re-injury. [] Progressing: [] Met: [] Not Met: [] Adjusted  2. Patient will have a decrease in pain to facilitate improvement in movement, function, and ADLs as indicated by Functional Deficits. [] Progressing: [] Met: [] Not Met: [] Adjusted    Long Term Goals: To be achieved in: 4-6 weeks  1. Disability index score of 10% or less for the LEFS to assist with reaching prior level of function. [] Progressing: [] Met: [] Not Met: [] Adjusted  2. Disability index score of 5% or less for the LIBBY to assist with reaching prior level of function. [] Progressing: [] Met: [] Not Met: [] Adjusted  3. Patient will be able to walk >2 miles around campus without R knee symptoms or restriction. [] Progressing: [] Met: [] Not Met: [] Adjusted  4. Patient will be able squat under load without increased R knee symptoms or restriction. [] Progressing: [] Met: [] Not Met: [] Adjusted  5. Patient will be able to dead lift under load without increased R knee symptoms or restriction. [] Progressing: [] Met: [] Not Met: [] Adjusted     ASSESSMENT:  Patient passed 1.5 mile PT fitness test run with no low back or R knee/fibular pain. Mainly limited during the run due to cardiovascular fitness rather than pain. Does note that R fibular head/posterior knee pain reproduced with crossing his R leg over his L to put his socks/shoes on likely due to continued R hip mobility limitations. Initiated banded hip self mobilizations today to address remaining hip IR and ER deficits and to improve carryover in mobility and pain sxs between sessions. Exercise focus on progression of glut activation and motor control tasks.  Patient really struggles to individually activate/turn on gluts. Basilia pose glut activation was a good challenge for patient. He really wanted to over utilize quads to achieve terminal hip extension rather than using glut likely contributing to low back and R posterior knee pain with tasks like dead lifting, OH pressing, etc that often require power production through achievement of triple extension at multiple joints including the hip. Return to Play: (if applicable)   []  Stage 1: Intro to Strength   []  Stage 2: Return to Run and Strength   []  Stage 3: Return to Jump and Strength   []  Stage 4: Dynamic Strength and Agility   []  Stage 5: Sport Specific Training     []  Ready to Return to Play, Meets All Above Stages   []  Not Ready for Return to Sports   Comments:            Treatment/Activity Tolerance:  [x] Patient tolerated treatment well [] Patient limited by fatique  [] Patient limited by pain  [] Patient limited by other medical complications  [] Other:     Overall Progression Towards Functional goals/ Treatment Progress Update:  [x] Patient is progressing as expected towards functional goals listed. [] Progression is slowed due to complexities/Impairments listed. [] Progression has been slowed due to co-morbidities.   [] Plan just implemented, too soon to assess goals progression <30days   [] Goals require adjustment due to lack of progress  [] Patient is not progressing as expected and requires additional follow up with physician  [] Other    Prognosis for POC: [x] Good [] Fair  [] Poor    Patient requires continued skilled intervention: [x] Yes  [] No        PLAN: See eval  [x] Continue per plan of care [] Alter current plan (see comments)  [] Plan of care initiated [] Hold pending MD visit [] Discharge    Electronically signed by: Sushma Ribeiro, PT, DPT, MS, SCS    Note: If patient does not return for scheduled/recommended follow up visits, this note will serve as a discharge from care along with the most recent update on progress.

## 2022-04-21 ENCOUNTER — HOSPITAL ENCOUNTER (OUTPATIENT)
Dept: PHYSICAL THERAPY | Age: 22
Setting detail: THERAPIES SERIES
End: 2022-04-21
Payer: COMMERCIAL

## 2022-04-26 ENCOUNTER — HOSPITAL ENCOUNTER (OUTPATIENT)
Dept: PHYSICAL THERAPY | Age: 22
Setting detail: THERAPIES SERIES
Discharge: HOME | End: 2022-04-26
Payer: COMMERCIAL

## 2022-04-26 PROCEDURE — 97140 MANUAL THERAPY 1/> REGIONS: CPT

## 2022-04-26 PROCEDURE — 97110 THERAPEUTIC EXERCISES: CPT

## 2022-04-26 PROCEDURE — 97112 NEUROMUSCULAR REEDUCATION: CPT

## 2022-04-26 PROCEDURE — 20560 NDL INSJ W/O NJX 1 OR 2 MUSC: CPT

## 2022-04-26 PROCEDURE — 97032 APPL MODALITY 1+ESTIM EA 15: CPT

## 2022-04-26 NOTE — FLOWSHEET NOTE
[place] [address] [facility]  Phone: [phone] Fax: [phone]    Physical Therapy Treatment Note/ Progress Report:     Date:  2022    Patient Name:  Scot Wright  \"Darek\" :  2000  MRN: [id number]  Restrictions/Precautions:    Medical/Treatment Diagnosis Information:  ·  M25.561 R knee pain; M54.9 Back pain, unspecified location, laterality, and chronicity  ·  M25.561 R knee pain; M54.9 Back pain, unspecified location, laterality, and chronicity  Insurance/Certification information:   Tariq Mcdonald [address]   Physician Information:   Dr. Tami Alfonso of care signed (Y/N):     Date of Patient follow up with Physician:      Progress Report: []  Yes  [x]  No     Date Range for reporting period:  Beginning:  3/15/22  Endin days or 10 visits    Progress report due (10 Rx/or 30 days whichever is less):      Recertification due (POC duration/ or 90 days whichever is less): 4/15/22     Visit # Insurance Allowable Auth Needed   9 [employer]BS - 75 visits []Yes   [x]No     Latex Allergy:  [x]NO      []YES  Preferred Language for Healthcare:   [x]English       []other:  Functional Scale: 29% disability - LEFS (57/80); 12% disability - LIBBY (6/50)  Date assessed:  3/15/22    Pain level:  R knee:  0/10 at start of session; 3/10 at worst.  Low back:  0/10 at start of session; 3/10 at worst.      SUBJECTIVE:  Went for a run yesterday. Did have some pain in the back of his R knee immediately upon initiating the run. Went away on its own within 30 minutes following the run. R shoulder has been bothering him more the past two days than the back and R knee. Reports that he was able to [place] press without back pain, but R shoulder really hurt.      OBJECTIVE: See eval   Observation:    Test measurements:      RESTRICTIONS/PRECAUTIONS:    Exercises/Interventions:     Therapeutic Ex (49503)  Sets/sec Reps Notes/CUES   Hip ER stretch on mat - standing 20-30s 3-5 Bilat; HEP   Wolbach stretch 20-30s 3-5 Bilat; HEP   Banded hip IR/ER self mobs 1 6-12  Each position Wolbach pose position  Half kneeling w/self IR w/hand   Half kneeling rectus femoris stretch w/strap 20-30s 3-5 Bilat; HEP   Hamstring stretch in doorway  2-3 min LLLD stretch; bilat; HEP   TS self mobs w/foam roller 1 6-12 Each segment   Sidelying T/L rotation stretch 1 10 bilat   Quadruped T/S rotations 1 10 Cues to drive through stance UE for SA act   T/S ext over chair back   Attempted, but patient extends only at painful segment resulting in increased pain, so HELD. Subscap/lat stretch on table 10s 10 bilat   Sleeper stretch 10s 10 On R   Doorway ER stretch - 60 deg elevation, low angle 10s 10 bilat   Foam roller pec stretch  3-5 min Varying angles to get at a variety of pec fiber angles         Prone scap retractions - on floor 5s 10 Bilat; Cues to decrease lat use, improve rhomboid, middle trap use   Sidelying ER - 3# 1 15 bilat   Sidelying SA punch 2 10 Towel roll under elbow; bilat   TB low row - blue 2 10    SA wall rolls on bolster  2 10                Manual Intervention (96922)      Hip mobs - inferior glide, inferior lateral glide, IR/ER mobs, anterior glide w/IR/ER - prone bilat 10 min W/mulligan belt; Gr. II-III   T/S PA mobs/manips  5 min    Subscap pin & stretch on R  5 min    IASTM/STMob to T/S paraspinals bilat   bilat   CT junction manip  2 min Gr. V   Sidelying lumbar roll manip   Bilat; Gr. V               NMR re-education (09142)   CUES NEEDED   Flying squirrel - glut act 10s 10 with knee lift   Half kneeling glut act - on airex 2  20s 10  5 Chops on L  Holds on R - very challenging to chop   Prone hip ext - bed flexed 5s 10 Bilat; Cues for glut act, decreased lumbar paraspinals   Wolbach pose glut act 10s 10 Unable to knee lift at this time, focus on isometric glut act L>R   Standing hip abd - orange loop at ankles 2 10 bilat   Mod.  RDL/hip airplanes - back LE supported on wall, hand support in front 1 10 Bilat; Cues for glut act; Really struggles to actively utilize R glut   Ecc KB kickstand - 7.5# KBs (red) 1 10 bilat   Glut step ups on 10\" - slosh tube on back - 8# 1 8 bilat         Prone T - on floor 5s 10 Bilat; Cues to decrease lat use, improve rhomboid, middle trap/low trap use   Push up plus on wall 2 10 Motor control focus   TB ER to uppercut - Green 1 10 bilat                     Therapeutic Activity (68990)      Alter G TM walk/jog - 80% BW 2 min warm up walk 1 min jog/walk x5 intervals  Medium shorts (light blue)                                   Access Code: [email or web address]  URL: [email or web address]  Date: 03/15/2022  Prepared by: Amanda Montanez      Exercises  Standing Hip ER Stretch at Table - 1-2 x daily - 7 x weekly - 1 sets - 3-5 reps - 20-30s hold  Hip External Rotation Stretch - 1-2 x daily - 7 x weekly - 1 sets - 3-5 reps - 20-30s hold  Supine Hamstring Stretch with Doorway - 1-2 x daily - 7 x weekly - 1 sets - 3-5 reps - 20-30s hold  Half Kneeling Hip Flexor Stretch with Chair - 1-2 x daily - 7 x weekly - 1 sets - 3-5 reps - 20-30s hold  Sidelying Open Book Thoracic Rotation with Knee on Foam Roll - 1-2 x daily - 7 x weekly - 1 sets - 10 reps  Thoracic Extension Mobilization on Foam Roll - 1-2 x daily - 7 x weekly - 1 sets - 10 reps  Doorway Pec Stretch at 60 Elevation - 1-2 x daily - 7 x weekly - 1 sets - 10 reps - 10s hold  Scapular Protraction at Wall - 1-2 x daily - 7 x weekly - 2-3 sets - 10 reps  Quadruped Thoracic Rotation with Hand on Neck - 1-2 x daily - 7 x weekly - 1 sets - 10 reps - 5s hold    Spoke with Dr. Neal Florida regarding the use of Dry Needling     Dry needling manual therapy: consisted on the placement of 4 needles in the following muscles:  R pec major/minor. A 40-50 mm needle was inserted, piston, rotated, and coned to produce intramuscular mobilization.   These techniques were used to restore functional range of motion, reduce muscle spasm and induce healing in the corresponding musculature. (31438)  Clean Technique was utilized today while applying Dry needling treatment. The treatment sites where cleaned with 70% solution of  isopropyl alcohol . The PT washed their hands and utilized treatment gloves along with hand  prior to inserting the needles. All needles where removed and discarded in the appropriate sharps container. MD has given verbal and/or written approval for this treatment. Attended low frequency (1-20Hz) electrical stimulation was utilized in conjunction with Dry Needling:  the Estim was manipulated between all above needles for a period of 10 min. at 2-4 volts. The low frequency electrical stimulation was used to help reduce muscle spasm and help to interrupt /[place] the pain cycle. (76331)     Therapeutic Exercise and NMR EXR  [x] (74040) Provided verbal/tactile cueing for activities related to strengthening, flexibility, endurance, ROM for improvements in LE, proximal hip, and core control with self care, mobility, lifting, ambulation. [x] (07818) Provided verbal/tactile cueing for activities related to improving balance, coordination, kinesthetic sense, posture, motor skill, proprioception  to assist with LE, proximal hip, and core control in self care, mobility, lifting, ambulation and eccentric single leg control.      NMR and Therapeutic Activities:    [x] (53622 or 47917) Provided verbal/tactile cueing for activities related to improving balance, coordination, kinesthetic sense, posture, motor skill, proprioception and motor activation to allow for proper function of core, proximal hip and LE with self care and ADLs and functional mobility.   [] (01989) Gait Re-education- Provided training and instruction to the patient for proper LE, core and proximal hip recruitment and positioning and eccentric body weight control with ambulation re-education including up and down stairs     Home Exercise Program: [x] (05183) Reviewed/Progressed HEP activities related to strengthening, flexibility, endurance, ROM of core, proximal hip and LE for functional self-care, mobility, lifting and ambulation/stair navigation   [] (41337)Reviewed/Progressed HEP activities related to improving balance, coordination, kinesthetic sense, posture, motor skill, proprioception of core, proximal hip and LE for self care, mobility, lifting, and ambulation/stair navigation      Manual Treatments:  PROM / STM / Oscillations-Mobs:  G-I, II, III, IV (PA's, Inf., Post.)  [x] (27217) Provided manual therapy to mobilize LE, proximal hip and/or LS spine soft tissue/joints for the purpose of modulating pain, promoting relaxation,  increasing ROM, reducing/eliminating soft tissue swelling/inflammation/restriction, improving soft tissue extensibility and allowing for proper ROM for normal function with self care, mobility, lifting and ambulation. Modalities:     [] GAME READY (VASO)- for significant edema, swelling, pain control. Charges:  Timed Code Treatment Minutes: 42   Total Treatment Minutes: 52      [] EVAL (LOW) 23939 (typically 20 minutes face-to-face)  [] EVAL (MOD) 71344 (typically 30 minutes face-to-face)  [] EVAL (HIGH) 98711 (typically 45 minutes face-to-face)  [] RE-EVAL     [x] HO(88564) x 1    [] DRY NEEDLE 1 OR 2 MUSCLES  [x] NMR (38889) x 1    [] DRY NEEDLE 3+ MUSCLES  [x] Manual (84690) x 1      [] TA (74986) x     [] [facility] (86624)  [] ES(attended) (83290)     [] ES (un) (20055):   [] VASO (60060)  [] Other:    GOALS:  Patient stated goal: Squat without knee pain. Be able to lift without back pain. [] Progressing: [] Met: [] Not Met: [] Adjusted    Therapist goals for Patient:   Short Term Goals: To be achieved in: 2 weeks  1. Independent in HEP and progression per patient tolerance, in order to prevent re-injury. [] Progressing: [] Met: [] Not Met: [] Adjusted  2.  Patient will have a decrease in pain to facilitate improvement in movement, function, and ADLs as indicated by Functional Deficits. [] Progressing: [] Met: [] Not Met: [] Adjusted    Long Term Goals: To be achieved in: 4-6 weeks  1. Disability index score of 10% or less for the LEFS to assist with reaching prior level of function. [] Progressing: [] Met: [] Not Met: [] Adjusted  2. Disability index score of 5% or less for the LIBBY to assist with reaching prior level of function. [] Progressing: [] Met: [] Not Met: [] Adjusted  3. Patient will be able to walk >2 miles around campus without R knee symptoms or restriction. [] Progressing: [] Met: [] Not Met: [] Adjusted  4. Patient will be able squat under load without increased R knee symptoms or restriction. [] Progressing: [] Met: [] Not Met: [] Adjusted  5. Patient will be able to dead lift under load without increased R knee symptoms or restriction. [] Progressing: [] Met: [] Not Met: [] Adjusted     ASSESSMENT:  R anterior shoulder causing more discomfort today. R pec major/minor very tight, restricted addressed with DN/e-stim to the area followed by self stretching. Patient also very restricted in subscap addressed with manual techniques. Continued all manual techniques addressing CT junction and T/S mobility restrictions as these likely contribute to R shoulder sxs. Exercise focus on improved use of rhomboids, middle traps, and low trap. Patient tends to over utilize lats via [facility] joint depression and T/L junction extension to achieve OH motion likely why he also has pain in T/L junction with OH pressing tasks.       Return to Play: (if applicable)   []  Stage 1: Intro to Strength   []  Stage 2: Return to Run and Strength   []  Stage 3: Return to Jump and Strength   []  Stage 4: Dynamic Strength and Agility   []  Stage 5: Sport Specific Training     []  Ready to Return to Play, Meets All Above Stages   []  Not Ready for Return to Sports   Comments:            Treatment/Activity Tolerance:  [x] Patient tolerated treatment well [] Patient limited by fatique  [] Patient limited by pain  [] Patient limited by other medical complications  [] Other:     Overall Progression Towards Functional goals/ Treatment Progress Update:  [x] Patient is progressing as expected towards functional goals listed. [] Progression is slowed due to complexities/Impairments listed. [] Progression has been slowed due to co-morbidities. [] Plan just implemented, too soon to assess goals progression <30days   [] Goals require adjustment due to lack of progress  [] Patient is not progressing as expected and requires additional follow up with physician  [] Other    Prognosis for POC: [x] Good [] Fair  [] Poor    Patient requires continued skilled intervention: [x] Yes  [] No        PLAN: See eval  [x] Continue per plan of care [] Alter current plan (see comments)  [] Plan of care initiated [] Hold pending MD visit [] Discharge    Electronically signed by: Baljit Roberto, PT, DPT, MS, SCS    Note: If patient does not return for scheduled/recommended follow up visits, this note will serve as a discharge from care along with the most recent update on progress.

## 2022-04-28 ENCOUNTER — HOSPITAL ENCOUNTER (OUTPATIENT)
Dept: PHYSICAL THERAPY | Age: 22
Setting detail: THERAPIES SERIES
Discharge: HOME | End: 2022-04-28
Payer: COMMERCIAL

## 2022-04-28 PROCEDURE — 97140 MANUAL THERAPY 1/> REGIONS: CPT

## 2022-04-28 PROCEDURE — 97112 NEUROMUSCULAR REEDUCATION: CPT

## 2022-04-28 NOTE — FLOWSHEET NOTE
[facility] [address] [facility]  Phone: [phone] Fax: [phone]    Physical Therapy Treatment Note/ Progress Report:     Date:  2022    Patient Name:  Ja Palomo  \"Darek\" :  2000  MRN: [id number]  Restrictions/Precautions:    Medical/Treatment Diagnosis Information:  ·  M25.561 R knee pain; M54.9 Back pain, unspecified location, laterality, and chronicity  ·  M25.561 R knee pain; M54.9 Back pain, unspecified location, laterality, and chronicity  Insurance/Certification information:   Tariq Mcdonald 150   Physician Information:   Dr. Krzysztof Barnes of care signed (Y/N):     Date of Patient follow up with Physician:      Progress Report: []  Yes  [x]  No     Date Range for reporting period:  Beginning:  3/15/22  Endin days or 10 visits    Progress report due (10 Rx/or 30 days whichever is less): 4/15/22 Progress note nv. Recertification due (POC duration/ or 90 days whichever is less): 4/15/22     Visit # Insurance Allowable Auth Needed   10 BCBS - 75 visits []Yes   [x]No     Latex Allergy:  [x]NO      []YES  Preferred Language for Healthcare:   [x]English       []other:  Functional Scale: 29% disability - LEFS (57/80); 12% disability - LIBBY (6/50)  Date assessed:  3/15/22    Pain level:  R knee:  0/10 at start of session; 3/10 at worst.  Low back:  0/10 at start of session; 3/10 at worst.      SUBJECTIVE:  Reports that he has not had any low back discomfort for about 2 weeks now.      OBJECTIVE: See eval   Observation:    Test measurements:      RESTRICTIONS/PRECAUTIONS:    Exercises/Interventions:     Therapeutic Ex (97947)  Sets/sec Reps Notes/CUES   Hip ER stretch on mat - standing 20-30s 3-5 Bilat; HEP   [place] stretch 20-30s 3-5 Bilat; HEP   Banded hip IR/ER self mobs 1 6-12  Each position [place] pose position  Half kneeling w/self IR w/hand   Half kneeling rectus femoris stretch w/strap 20-30s 3-5 Bilat; HEP   Hamstring stretch in doorway  2-3 min LLLD stretch; bilat; HEP   TS self mobs w/foam roller 1 6-12 Each segment   Sidelying T/L rotation stretch 1 10 bilat   Quadruped T/S rotations 1 10 Cues to drive through stance UE for SA act   T/S ext over chair back   Attempted, but patient extends only at painful segment resulting in increased pain, so HELD. Subscap/lat stretch on table 10s 10 bilat   Sleeper stretch 10s 10 On R   Doorway ER stretch - 60 deg elevation, low angle 10s 10 bilat   Foam roller pec stretch  3-5 min Varying angles to get at a variety of pec fiber angles         Prone scap retractions - on floor 5s 10 Bilat; Cues to decrease lat use, improve rhomboid, middle trap use   Sidelying ER - 3# 1 15 bilat   Sidelying SA punch 2 10 Towel roll under elbow; bilat   TB low row - blue 2 10    SA wall rolls on bolster  2 10                Manual Intervention (21950)      Hip mobs - inferior glide, inferior lateral glide, IR/ER mobs, anterior glide w/IR/ER - prone bilat 12 min W/mulligan belt; Gr. II-III   T/S PA mobs/manips  8 min    Subscap pin & stretch on R  5 min    IASTM/STMob to T/S paraspinals bilat   bilat   CT junction manip  2 min Gr. V   Sidelying lumbar roll manip   Bilat; Gr. V               NMR re-education (70379)   CUES NEEDED   Flying squirrel - glut act 10s 10 with knee lift   Prone multifidi lifts 10s 10 Cues for glut squeeze first   Half kneeling glut act - on airex 2  20s 10  5 Chops on L  Holds on R - very challenging to chop   Prone hip ext - bed flexed 5s 10 Bilat; Cues for glut act, decreased lumbar paraspinals   [place] pose glut act 10s 10 Unable to knee lift at this time, focus on isometric glut act L>R   Standing hip abd isos against wall 5s 10 bilat   Standing hip abd - orange loop at ankles 2 10 bilat   Mod.  RDL/hip airplanes - back LE supported on wall, hand support in front 1 10 Bilat; Cues for glut act; Really struggles to actively utilize R glut   Ecc KB kickstand - 7.5# KBs (red) 1 10 bilat   Glut step ups on 10\" - slosh tube on back - 8# 1 8 bilat         Prone T - on floor 5s 10 Bilat; Cues to decrease lat use, improve rhomboid, middle trap/low trap use   Push up plus on wall 2 10 Motor control focus   TB ER to uppercut - Green 1 10 bilat                     Therapeutic Activity (76214)      Alter G TM walk/jog - 80% BW 2 min warm up walk 1 min jog/walk x5 intervals  Medium shorts (light blue)                                   Access Code: [email or web address]  URL: [employer]/  Date: 03/15/2022  Prepared by: Leslie Gill      Exercises  Standing Hip ER Stretch at Table - 1-2 x daily - 7 x weekly - 1 sets - 3-5 reps - 20-30s hold  Hip External Rotation Stretch - 1-2 x daily - 7 x weekly - 1 sets - 3-5 reps - 20-30s hold  Supine Hamstring Stretch with Doorway - 1-2 x daily - 7 x weekly - 1 sets - 3-5 reps - 20-30s hold  Half Kneeling Hip Flexor Stretch with Chair - 1-2 x daily - 7 x weekly - 1 sets - 3-5 reps - 20-30s hold  Sidelying Open Book Thoracic Rotation with Knee on Foam Roll - 1-2 x daily - 7 x weekly - 1 sets - 10 reps  Thoracic Extension Mobilization on Foam Roll - 1-2 x daily - 7 x weekly - 1 sets - 10 reps  Doorway Pec Stretch at 60 Elevation - 1-2 x daily - 7 x weekly - 1 sets - 10 reps - 10s hold  Scapular Protraction at Wall - 1-2 x daily - 7 x weekly - 2-3 sets - 10 reps  Quadruped Thoracic Rotation with Hand on Neck - 1-2 x daily - 7 x weekly - 1 sets - 10 reps - 5s hold    Spoke with Dr. Lilly Pritchard regarding the use of Dry Needling     Dry needling manual therapy: consisted on the placement of 4 needles in the following muscles:  R pec major/minor. A 40-50 mm needle was inserted, piston, rotated, and coned to produce intramuscular mobilization. These techniques were used to restore functional range of motion, reduce muscle spasm and induce healing in the corresponding musculature.  (28270)  Clean Technique was utilized today while applying Dry needling treatment. The treatment sites where cleaned with 70% solution of  isopropyl alcohol . The PT washed their hands and utilized treatment gloves along with hand  prior to inserting the needles. All needles where removed and discarded in the appropriate sharps container. MD has given verbal and/or written approval for this treatment. Attended low frequency (1-20Hz) electrical stimulation was utilized in conjunction with Dry Needling:  the Estim was manipulated between all above needles for a period of 10 min. at 2-4 volts. The low frequency electrical stimulation was used to help reduce muscle spasm and help to interrupt /[place] the pain cycle. (78295)     Therapeutic Exercise and NMR EXR  [x] (82665) Provided verbal/tactile cueing for activities related to strengthening, flexibility, endurance, ROM for improvements in LE, proximal hip, and core control with self care, mobility, lifting, ambulation. [x] (40698) Provided verbal/tactile cueing for activities related to improving balance, coordination, kinesthetic sense, posture, motor skill, proprioception  to assist with LE, proximal hip, and core control in self care, mobility, lifting, ambulation and eccentric single leg control.      NMR and Therapeutic Activities:    [x] (34900 or 57350) Provided verbal/tactile cueing for activities related to improving balance, coordination, kinesthetic sense, posture, motor skill, proprioception and motor activation to allow for proper function of core, proximal hip and LE with self care and ADLs and functional mobility.   [] (75731) Gait Re-education- Provided training and instruction to the patient for proper LE, core and proximal hip recruitment and positioning and eccentric body weight control with ambulation re-education including up and down stairs     Home Exercise Program:    [x] (97368) Reviewed/Progressed HEP activities related to strengthening, flexibility, endurance, ROM of core, proximal hip and LE for functional self-care, mobility, lifting and ambulation/stair navigation   [] (05122)Reviewed/Progressed HEP activities related to improving balance, coordination, kinesthetic sense, posture, motor skill, proprioception of core, proximal hip and LE for self care, mobility, lifting, and ambulation/stair navigation      Manual Treatments:  PROM / STM / Oscillations-Mobs:  G-I, II, III, IV (PA's, Inf., Post.)  [x] (95234) Provided manual therapy to mobilize LE, proximal hip and/or LS spine soft tissue/joints for the purpose of modulating pain, promoting relaxation,  increasing ROM, reducing/eliminating soft tissue swelling/inflammation/restriction, improving soft tissue extensibility and allowing for proper ROM for normal function with self care, mobility, lifting and ambulation. Modalities:     [] GAME READY (VASO)- for significant edema, swelling, pain control. Charges:  Timed Code Treatment Minutes: 50   Total Treatment Minutes: 50      [] EVAL (LOW) 40126 (typically 20 minutes face-to-face)  [] EVAL (MOD) 84647 (typically 30 minutes face-to-face)  [] EVAL (HIGH) 14719 (typically 45 minutes face-to-face)  [] RE-EVAL     [] YA(96658) x     [] DRY NEEDLE 1 OR 2 MUSCLES  [x] NMR (53571) x 2    [] DRY NEEDLE 3+ MUSCLES  [x] Manual (41155) x 1      [] TA (47266) x     [] Mech Traction (35360)  [] ES(attended) (66111)     [] ES (un) (77156):   [] VASO (27558)  [] Other:    GOALS:  Patient stated goal: Squat without knee pain. Be able to lift without back pain. [] Progressing: [] Met: [] Not Met: [] Adjusted    Therapist goals for Patient:   Short Term Goals: To be achieved in: 2 weeks  1. Independent in HEP and progression per patient tolerance, in order to prevent re-injury. [] Progressing: [] Met: [] Not Met: [] Adjusted  2. Patient will have a decrease in pain to facilitate improvement in movement, function, and ADLs as indicated by Functional Deficits.   [] Progressing: [] Met: [] Not Met: [] Adjusted    Long Term Goals: To be achieved in: 4-6 weeks  1. Disability index score of 10% or less for the LEFS to assist with reaching prior level of function. [] Progressing: [] Met: [] Not Met: [] Adjusted  2. Disability index score of 5% or less for the LIBBY to assist with reaching prior level of function. [] Progressing: [] Met: [] Not Met: [] Adjusted  3. Patient will be able to walk >2 miles around campus without R knee symptoms or restriction. [] Progressing: [] Met: [] Not Met: [] Adjusted  4. Patient will be able squat under load without increased R knee symptoms or restriction. [] Progressing: [] Met: [] Not Met: [] Adjusted  5. Patient will be able to dead lift under load without increased R knee symptoms or restriction. [] Progressing: [] Met: [] Not Met: [] Adjusted     ASSESSMENT:  Focused on causes/sources of low back discomfort today, although patient reports no low back discomfort for at least two weeks now. Hip mobility is improving, but some end range limitations still remain. Continued all manual hip joint mobilizations to address restrictions in hip mobility bilaterally followed by progression of glut activation/firing tasks. Patient's glut activation is definitely improving, but still really struggles to activate with end range hip positions like when in pigeon pose likely why he is still intermittently having some posterior R knee/fibular head pain sxs.      Return to Play: (if applicable)   []  Stage 1: Intro to Strength   []  Stage 2: Return to Run and Strength   []  Stage 3: Return to Jump and Strength   []  Stage 4: Dynamic Strength and Agility   []  Stage 5: Sport Specific Training     []  Ready to Return to Play, Meets All Above Stages   []  Not Ready for Return to Sports   Comments:            Treatment/Activity Tolerance:  [x] Patient tolerated treatment well [] Patient limited by fatique  [] Patient limited by pain  [] Patient limited by other medical complications  [] Other: Overall Progression Towards Functional goals/ Treatment Progress Update:  [x] Patient is progressing as expected towards functional goals listed. [] Progression is slowed due to complexities/Impairments listed. [] Progression has been slowed due to co-morbidities. [] Plan just implemented, too soon to assess goals progression <30days   [] Goals require adjustment due to lack of progress  [] Patient is not progressing as expected and requires additional follow up with physician  [] Other    Prognosis for POC: [x] Good [] Fair  [] Poor    Patient requires continued skilled intervention: [x] Yes  [] No        PLAN: 2x per week for 4-6 weeks  [x] Continue per plan of care [] Alter current plan (see comments)  [] Plan of care initiated [] Hold pending MD visit [] Discharge    Electronically signed by: Maurisio Hannon, PT, DPT, MS, SCS    Note: If patient does not return for scheduled/recommended follow up visits, this note will serve as a discharge from care along with the most recent update on progress.

## 2022-05-03 ENCOUNTER — HOSPITAL ENCOUNTER (OUTPATIENT)
Dept: PHYSICAL THERAPY | Age: 22
Setting detail: THERAPIES SERIES
Discharge: HOME | End: 2022-05-03
Payer: COMMERCIAL

## 2022-05-03 PROCEDURE — 97140 MANUAL THERAPY 1/> REGIONS: CPT

## 2022-05-03 PROCEDURE — 97032 APPL MODALITY 1+ESTIM EA 15: CPT

## 2022-05-03 PROCEDURE — 20560 NDL INSJ W/O NJX 1 OR 2 MUSC: CPT

## 2022-05-03 PROCEDURE — 97110 THERAPEUTIC EXERCISES: CPT

## 2022-05-03 PROCEDURE — 97112 NEUROMUSCULAR REEDUCATION: CPT

## 2022-05-03 NOTE — PLAN OF CARE
[place] [address] [address]  Phone: [phone] Fax: [phone]      Physical Therapy Re-Certification Plan of Care/MD UPDATE      Dear Dr. Porfirio Castillo,    We had the pleasure of treating the following patient for physical therapy services at [address] [place] [address]. A summary of our findings can be found in the updated assessment below. This includes our plan of care. If you have any questions or concerns regarding these findings, please do not hesitate to contact me at the office phone number checked above. Thank you for the referral.     Physician Signature:________________________________Date:__________________  By signing above (or electronic signature), therapists plan is approved by physician    Date Range Of Visits: 3/15/22 to 5/3/22  Total Visits to Date: 6  Overall Response to Treatment:   [x]Patient is responding well to treatment and improvement is noted with regards  to goals   []Patient should continue to improve in reasonable time if they continue HEP   []Patient has plateaued and is no longer responding to skilled PT intervention    []Patient is getting worse and would benefit from return to referring MD   []Patient unable to adhere to initial POC   [x]Other: Patient has been seen for 11 PT visits thus far for several areas of concern including R posterior knee/fibular head pain. R posterior knee/fibular head pain is doing much better. Patient now squatting pain free. Even able to initiate loaded back squatting to full depth pain free outside of PT sessions. Updated assessment demonstrates really good improvement in bilateral hip mobility, however, there are still some mild limitations in the R>L hip that still need addressed.  Patient's glut firing pattern is much improved, but he does still fatigue with glut activation/strength tasks resulting in referral back to previous lumbar compensatory strategy resulting in feelings of lumbar tightness/stiffness. Patient also still demonstrating poor hamstring strength and endurance when he has to use the entire length of the muscle, I.e when he has to activate/contract that hamstring in a lengthened position potentially still contributing to occasional R posterior knee pain. Also addressing mid back/T/L junction and R anterior shoulder pain occurring secondary to deficits in GHJ mobility and shoulder muscle flexibility deficits as well as deficits in rhomboid, low trap, and middle trap activation/motor control. Patient will continue to benefit from skilled PT to address remaining limitations to enable full, safe return to high level functional tasks required for [address] duties.      Physical Therapy Treatment Note/ Progress Report:     Date:  5/3/2022    Patient Name:  Harley Bardales  \"Darek\" :  2000  MRN: [id number]  Restrictions/Precautions:    Medical/Treatment Diagnosis Information:  ·  M25.561 R knee pain; M54.9 Back pain, unspecified location, laterality, and chronicity  ·  M25.561 R knee pain; M54.9 Back pain, unspecified location, laterality, and chronicity  Insurance/Certification information:   Ada Miles   Physician Information:   Dr. Eriberto June of care signed (Y/N):     Date of Patient follow up with Physician:      Progress Report: [x]  Yes  []  No     Date Range for reporting period:  Beginning:  3/15/22  Endin days or 10 visits    Progress report due (10 Rx/or 30 days whichever is less): 3/0/00     Recertification due (POC duration/ or 90 days whichever is less): 6/3/22     Visit # Insurance Allowable Auth Needed   11 [employer] - 75 visits []Yes   [x]No     Latex Allergy:  [x]NO      []YES  Preferred Language for Healthcare:   [x]English       []other:   Functional Scale: 5% disability - LEFS (76/80); 12% disability - LIBBY (50) TBA  Date assessed:  5/3/22    Pain level:  R knee:  0/10 at start of session; 3/10 at worst.  Low back:  0/10 at start of session; 3/10 at worst.      SUBJECTIVE:  Reports that he has not had any low back discomfort for about 3 weeks now. Even able to [place] press during a workout yesterday with only mild R anterior shoulder pinching/pain worst when controlling the weight back down from [place] position. R knee pain has been better. Started back squatting with a light load (135#) with no R knee pain, just fatigued and sore in leg muscles afterwards. OBJECTIVE: 5/3/22   Observation:    Test measurements:    ROM LEFT RIGHT   Lumbar Flex WNL     Lumbar Ext WNL      Lumbar SB WNL              HIP Flex 120 120   HIP Abd       HIP Ext       HIP IR 30 30   HIP ER 45 38   Knee ext 0 0   Knee Flex WNL WNL  No pain. Ankle PF       Ankle DF       Ankle In       Ankle Ev       Strength  LEFT RIGHT   HIP Flexors       HIP Abductors 4+/5 4+/5    HIP Ext 4+/5 Good act pattern noted. Feels increased tightness/stiffness in low back. Poor endurance. 4+/5 Good act pattern noted. Feels increased tightness/stiffness in low back. Poor endurance. Hip ER       Knee EXT (quad) 4+/5 4+/5   Knee Flex (HS) 4+/5 4+/5 Weaker at longer muscle length. Poor endurance. Ankle DF       Ankle PF       Ankle Inv       Ankle EV               Circumference  Mid apex  7 cm prox         No significant swelling evident.         Joint mobility: R>L hip mobility; GHJ IR/posterior capsule mobility on R>L              []?Normal                       [x]? Hypo              []?Hyper     Functional Mobility/Transfers:   Squat:  Good mechanics noted; no posterior R knee or fibular head pain at full depth.      Posture: Significant lumbar paraspinal bulk noted; decreased lumbar lordosis; decreased thoracic kyphosis.     Gait: No significant gait deviations noted, no antalgia evident.     Orthopedic Special Tests: Hamstring flexibility mildly restricted bilaterally. Rectus femoris length normal bilaterally. Hip flexor length mildly restricted bilaterally.   [address] flexibility mildly restricted in end range OH R shoulder flexion. *Noted poor rhomboid, low trap, middle trap endurance. Motor control when moving into OH ranges is improving, but still difficult when fully OH. Fatigues quickly. RESTRICTIONS/PRECAUTIONS:    Exercises/Interventions:     Therapeutic Ex (74479)  Sets/sec Reps Notes/CUES   Hip ER stretch on mat - standing 20-30s 3-5 Bilat; HEP   Maben stretch 20-30s 3-5 Bilat; HEP   Banded hip IR/ER self mobs 1 6-12  Each position Maben pose position  Half kneeling w/self IR w/hand   Half kneeling rectus femoris stretch w/strap 20-30s 3-5 Bilat; HEP   Hamstring stretch in doorway  2-3 min LLLD stretch; bilat; HEP   TS self mobs w/foam roller 1 6-12 Each segment   Sidelying T/L rotation stretch 1 10 bilat   Quadruped T/S rotations 1 10 Cues to drive through stance UE for SA act   T/S ext over chair back   Attempted, but patient extends only at painful segment resulting in increased pain, so HELD. Subscap/lat stretch on table 10s 10 bilat   Sleeper stretch 10s 10 On R   Doorway ER stretch - 60 deg elevation, low angle 10s 10 bilat   Foam roller pec stretch  3-5 min Varying angles to get at a variety of pec fiber angles         Prone scap retractions - on floor 5s 10 Bilat; Cues to decrease lat use, improve rhomboid, middle trap use   Sidelying ER - 3# 1 15 bilat   Sidelying SA punch 2 10 Towel roll under elbow; bilat   TB low row - blue 2 10    SA wall rolls on bolster  - orange loop 2 10    TB horizontal abd - green 2 8 Post delt, rhomboid/low trap focus         Manual Intervention (96610)      Hip mobs - inferior glide, inferior lateral glide, IR/ER mobs, anterior glide w/IR/ER - prone bilat 8 min W/mulligan belt; Gr. II-III   T/S PA mobs/manips  4 min    Subscap pin & stretch on R - sidelying  4 min    Posterior capsule stretch - sidelying  4 min    IASTM/STMob to T/S paraspinals bilat   bilat   CT junction manip  2 min Gr.  V   Sidelying lumbar roll manip Bilat; Gr. V               NMR re-education (36043)   CUES NEEDED   Flying squirrel - glut act 10s 10 with knee lift   Prone multifidi lifts 10s 10 Cues for glut squeeze first   Half kneeling glut act - on airex 2  20s 10  5 Chops on L  Holds on R - very challenging to chop   Prone hip ext - bed flexed 5s 10 Bilat; Cues for glut act, decreased lumbar paraspinals   Amery pose glut act 10s 10 Unable to knee lift at this time, focus on isometric glut act L>R   Standing hip abd isos against wall 5s 10 bilat   Standing hip abd - orange loop at ankles 2 10 bilat   Mod. RDL/hip airplanes - back LE supported on wall, hand support in front 1 10 Bilat; Cues for glut act; Really struggles to actively utilize R glut   Ecc KB kickstand - 7.5# KBs (red) 1 10 bilat   Glut step ups on 10\" - slosh tube on back - 8# 1 8 bilat         Prone T - on floor 5s 10 Bilat; Cues to decrease lat use, improve rhomboid, middle trap/low trap use   Prone snow elisabeth with sliders 1 15 Bilat; motor control focus throughout range. Difficult all the way OH. Push up plus on wall 2 10 Motor control focus   TB ER to uppercut - Green 1 10 bilat                     Therapeutic Activity (79258)      Alter G TM walk/jog - 80% BW 2 min warm up walk 1 min jog/walk x5 intervals  Medium shorts (light blue)                                   Access Code: [email or web address]  URL: [email or web address]  Date: 03/15/2022  Prepared by: Fiordaliza Schreiber      Exercises  Standing Hip ER Stretch at Table - 1-2 x daily - 7 x weekly - 1 sets - 3-5 reps - 20-30s hold  Hip External Rotation Stretch - 1-2 x daily - 7 x weekly - 1 sets - 3-5 reps - 20-30s hold  Supine Hamstring Stretch with Doorway - 1-2 x daily - 7 x weekly - 1 sets - 3-5 reps - 20-30s hold  Half Kneeling Hip Flexor Stretch with Chair - 1-2 x daily - 7 x weekly - 1 sets - 3-5 reps - 20-30s hold  Sidelying Open Book Thoracic Rotation with Knee on Foam Roll - 1-2 x daily - 7 x weekly - 1 sets - 10 reps  Thoracic Extension Mobilization on Foam Roll - 1-2 x daily - 7 x weekly - 1 sets - 10 reps  Doorway Pec Stretch at 60 Elevation - 1-2 x daily - 7 x weekly - 1 sets - 10 reps - 10s hold  Scapular Protraction at Wall - 1-2 x daily - 7 x weekly - 2-3 sets - 10 reps  Quadruped Thoracic Rotation with Hand on Neck - 1-2 x daily - 7 x weekly - 1 sets - 10 reps - 5s hold    Spoke with Dr. Yeison Mortensen regarding the use of Dry Needling     Dry needling manual therapy: consisted on the placement of 6 needles in the following muscles:  R pec subscap, infraspinatus/teres major. A 40-50 mm needle was inserted, piston, rotated, and coned to produce intramuscular mobilization. These techniques were used to restore functional range of motion, reduce muscle spasm and induce healing in the corresponding musculature. (63375)  Clean Technique was utilized today while applying Dry needling treatment. The treatment sites where cleaned with 70% solution of  isopropyl alcohol . The PT washed their hands and utilized treatment gloves along with hand  prior to inserting the needles. All needles where removed and discarded in the appropriate sharps container. MD has given verbal and/or written approval for this treatment. Attended low frequency (1-20Hz) electrical stimulation was utilized in conjunction with Dry Needling:  the Estim was manipulated between all above needles for a period of 10 min. at 2-4 volts. The low frequency electrical stimulation was used to help reduce muscle spasm and help to interrupt /[place] the pain cycle. (80542)     Therapeutic Exercise and NMR EXR  [x] (79485) Provided verbal/tactile cueing for activities related to strengthening, flexibility, endurance, ROM for improvements in LE, proximal hip, and core control with self care, mobility, lifting, ambulation.   [x] (10112) Provided verbal/tactile cueing for activities related to improving balance, coordination, kinesthetic sense, posture, motor skill, proprioception  to assist with LE, proximal hip, and core control in self care, mobility, lifting, ambulation and eccentric single leg control. NMR and Therapeutic Activities:    [x] (93396 or 81172) Provided verbal/tactile cueing for activities related to improving balance, coordination, kinesthetic sense, posture, motor skill, proprioception and motor activation to allow for proper function of core, proximal hip and LE with self care and ADLs and functional mobility.   [] (09785) Gait Re-education- Provided training and instruction to the patient for proper LE, core and proximal hip recruitment and positioning and eccentric body weight control with ambulation re-education including up and down stairs     Home Exercise Program:    [x] (98762) Reviewed/Progressed HEP activities related to strengthening, flexibility, endurance, ROM of core, proximal hip and LE for functional self-care, mobility, lifting and ambulation/stair navigation   [] (26220)Reviewed/Progressed HEP activities related to improving balance, coordination, kinesthetic sense, posture, motor skill, proprioception of core, proximal hip and LE for self care, mobility, lifting, and ambulation/stair navigation      Manual Treatments:  PROM / STM / Oscillations-Mobs:  G-I, II, III, IV (PA's, Inf., Post.)  [x] (87808) Provided manual therapy to mobilize LE, proximal hip and/or LS spine soft tissue/joints for the purpose of modulating pain, promoting relaxation,  increasing ROM, reducing/eliminating soft tissue swelling/inflammation/restriction, improving soft tissue extensibility and allowing for proper ROM for normal function with self care, mobility, lifting and ambulation. Modalities:     [] GAME READY (VASO)- for significant edema, swelling, pain control.      Charges:  Timed Code Treatment Minutes: 50   Total Treatment Minutes: 60      [] EVAL (LOW) 44602 (typically 20 minutes face-to-face)  [] EVAL (MOD) 19641 (typically 30 minutes face-to-face)  [] EVAL (HIGH) 79422 (typically 45 minutes face-to-face)  [] RE-EVAL     [x] CD(86873) x 1    [x] DRY NEEDLE 1 OR 2 MUSCLES  [x] NMR (60471) x 1    [] DRY NEEDLE 3+ MUSCLES  [x] Manual (62931) x 1      [] TA (18579) x     [] Mech Traction (81759)  [x] ES(attended) (77750)     [] ES (un) (40518):   [] VASO (07978)  [] Other:    GOALS:  Patient stated goal: Squat without knee pain. Be able to lift without back pain. [x] Progressing: [] Met: [] Not Met: [] Adjusted    Therapist goals for Patient:   Short Term Goals: To be achieved in: 2 weeks  1. Independent in HEP and progression per patient tolerance, in order to prevent re-injury. [] Progressing: [x] Met: [] Not Met: [] Adjusted  2. Patient will have a decrease in pain to facilitate improvement in movement, function, and ADLs as indicated by Functional Deficits. [] Progressing: [x] Met: [] Not Met: [] Adjusted    Long Term Goals: To be achieved in: 4-6 weeks  1. Disability index score of 10% or less for the LEFS to assist with reaching prior level of function. [] Progressing: [x] Met: [] Not Met: [] Adjusted  Comment:  5% disability on LEFS as of 5/3/22  2. Disability index score of 5% or less for the LIBBY to assist with reaching prior level of function. [x] Progressing: [] Met: [] Not Met: [] Adjusted  3. Patient will be able to walk >2 miles around campus without R knee symptoms or restriction. [] Progressing: [x] Met: [] Not Met: [] Adjusted  4. Patient will be able squat under load without increased R knee symptoms or restriction. [] Progressing: [x] Met: [] Not Met: [] Adjusted  5. Patient will be able to dead lift under load without increased R knee symptoms or restriction. [x] Progressing: [] Met: [] Not Met: [] Adjusted     ASSESSMENT:  Patient has been seen for 11 PT visits thus far for several areas of concern including R posterior knee/fibular head pain. R posterior knee/fibular head pain is doing much better.  Patient now squatting pain free. Even able to initiate loaded back squatting to full depth pain free outside of PT sessions. Updated assessment demonstrates really good improvement in bilateral hip mobility, however, there are still some mild limitations in the R>L hip that still need addressed. Patient's glut firing pattern is much improved, but he does still fatigue with glut activation/strength tasks resulting in referral back to previous lumbar compensatory strategy resulting in feelings of lumbar tightness/stiffness. Patient also still demonstrating poor hamstring strength and endurance when he has to use the entire length of the muscle, I.e when he has to activate/contract that hamstring in a lengthened position potentially still contributing to occasional R posterior knee pain. Also addressing mid back/T/L junction and R anterior shoulder pain occurring secondary to deficits in GHJ mobility and shoulder muscle flexibility deficits as well as deficits in rhomboid, low trap, and middle trap activation/motor control. Patient will continue to benefit from skilled PT to address remaining limitations to enable full, safe return to high level functional tasks required for [address] [facility] duties. Return to Play: (if applicable)   []  Stage 1: Intro to Strength   []  Stage 2: Return to Run and Strength   []  Stage 3: Return to Jump and Strength   []  Stage 4: Dynamic Strength and Agility   []  Stage 5: Sport Specific Training     []  Ready to Return to Play, Meets All Above Stages   []  Not Ready for Return to Sports   Comments:            Treatment/Activity Tolerance:  [x] Patient tolerated treatment well [] Patient limited by fatique  [] Patient limited by pain  [] Patient limited by other medical complications  [] Other:     Overall Progression Towards Functional goals/ Treatment Progress Update:  [x] Patient is progressing as expected towards functional goals listed.     [] Progression is slowed due to complexities/Impairments listed. [] Progression has been slowed due to co-morbidities. [] Plan just implemented, too soon to assess goals progression <30days   [] Goals require adjustment due to lack of progress  [] Patient is not progressing as expected and requires additional follow up with physician  [] Other    Prognosis for POC: [x] Good [] Fair  [] Poor    Patient requires continued skilled intervention: [x] Yes  [] No        PLAN: 2x per week for 4-6 weeks  [x] Continue per plan of care [] Alter current plan (see comments)  [] Plan of care initiated [] Hold pending MD visit [] Discharge    Electronically signed by: Amanda Montanez, PT, DPT, MS, SCS    Note: If patient does not return for scheduled/recommended follow up visits, this note will serve as a discharge from care along with the most recent update on progress.

## 2022-05-05 ENCOUNTER — HOSPITAL ENCOUNTER (OUTPATIENT)
Dept: PHYSICAL THERAPY | Age: 22
Setting detail: THERAPIES SERIES
Discharge: HOME | End: 2022-05-05
Payer: COMMERCIAL

## 2022-05-05 PROCEDURE — 97112 NEUROMUSCULAR REEDUCATION: CPT

## 2022-05-05 PROCEDURE — 97140 MANUAL THERAPY 1/> REGIONS: CPT

## 2022-05-05 NOTE — FLOWSHEET NOTE
[facility] [address] [address]  Phone: [phone] Fax: [phone]    Physical Therapy Treatment Note/ Progress Report:     Date:  2022    Patient Name:  Helio Velasquez  \"Darek\" :  2000  MRN: [id number]  Restrictions/Precautions:    Medical/Treatment Diagnosis Information:  ·  M25.561 R knee pain; M54.9 Back pain, unspecified location, laterality, and chronicity  ·  M25.561 R knee pain; M54.9 Back pain, unspecified location, laterality, and chronicity  Insurance/Certification information:   Ebony Calvo   Physician Information:   Dr. Teofilo [place] of care signed (Y/N):     Date of Patient follow up with Physician:      Progress Report: []  Yes  [x]  No     Date Range for reporting period:  Beginning:  3/15/22  Endin days or 10 visits    Progress report due (10 Rx/or 30 days whichever is less): 09     Recertification due (POC duration/ or 90 days whichever is less): 6/3/22     Visit # Insurance Allowable Auth Needed   12 [employer]BS - 75 visits []Yes   [x]No     Latex Allergy:  [x]NO      []YES  Preferred Language for Healthcare:   [x]English       []other:   Functional Scale: 5% disability - LEFS (76/80); 4% disability - LIBBY (2/50)  Date assessed:  5/3/22    Pain level:  R knee:  0/10 at start of session; 3/10 at worst.  Low back:  0/10 at start of session; 3/10 at worst.      SUBJECTIVE:  Reports that he started back to dead lifting at a weight of 135# outside of PT. Used some glut exercises performed in PT to \"prime\" the gluts prior to dead lifting and then really tried to focus on using gluts during the dead lift. Does have some soreness in his low back today, but did not have that same pain he had previously. Considers this a good start.     OBJECTIVE: 5/3/22   Observation:    Test measurements:    ROM LEFT RIGHT   Lumbar Flex WNL     Lumbar Ext WNL      Lumbar SB WNL              HIP Flex 120 120   HIP Abd     HIP Ext       HIP IR 30 30   HIP ER 45 38   Knee ext 0 0   Knee Flex WNL WNL  No pain. Ankle PF       Ankle DF       Ankle In       Ankle Ev       Strength  LEFT RIGHT   HIP Flexors       HIP Abductors 4+/5 4+/5    HIP Ext 4+/5 Good act pattern noted. Feels increased tightness/stiffness in low back. Poor endurance. 4+/5 Good act pattern noted. Feels increased tightness/stiffness in low back. Poor endurance. Hip ER       Knee EXT (quad) 4+/5 4+/5   Knee Flex (HS) 4+/5 4+/5 Weaker at longer muscle length. Poor endurance. Ankle DF       Ankle PF       Ankle Inv       Ankle EV               Circumference  Mid apex  7 cm prox         No significant swelling evident.         Joint mobility: R>L hip mobility; GHJ IR/posterior capsule mobility on R>L              []?Normal                       [x]? Hypo              []?Hyper     Functional Mobility/Transfers:   Squat:  Good mechanics noted; no posterior R knee or fibular head pain at full depth.      Posture: Significant lumbar paraspinal bulk noted; decreased lumbar lordosis; decreased thoracic kyphosis.     Gait: No significant gait deviations noted, no antalgia evident.     Orthopedic Special Tests: Hamstring flexibility mildly restricted bilaterally. Rectus femoris length normal bilaterally. Hip flexor length mildly restricted bilaterally. Subscap flexibility mildly restricted in end range OH R shoulder flexion. *Noted poor rhomboid, low trap, middle trap endurance. Motor control when moving into OH ranges is improving, but still difficult when fully OH. Fatigues quickly.       RESTRICTIONS/PRECAUTIONS:    Exercises/Interventions:     Therapeutic Ex (18517)  Sets/sec Reps Notes/CUES   Hip ER stretch on mat - standing 20-30s 3-5 Bilat; HEP   [place] stretch 20-30s 3-5 Bilat; HEP   Banded hip IR/ER self mobs 1 6-12  Each position [place] pose position  Half kneeling w/self IR w/hand   Half kneeling rectus femoris stretch w/strap 20-30s 3-5 Bilat; HEP   Hamstring stretch in doorway  2-3 min LLLD stretch; bilat; HEP   TS self mobs w/foam roller 1 6-12 Each segment   Sidelying T/L rotation stretch 1 10 bilat   Quadruped T/S rotations 1 10 Cues to drive through stance UE for SA act   T/S ext over chair back   Attempted, but patient extends only at painful segment resulting in increased pain, so HELD. Subscap/lat stretch on table 10s 10 bilat   Sleeper stretch 10s 10 On R   Doorway ER stretch - 60 deg elevation, low angle 10s 10 bilat   Foam roller pec stretch  3-5 min Varying angles to get at a variety of pec fiber angles         Prone scap retractions - on floor 5s 10 Bilat; Cues to decrease lat use, improve rhomboid, middle trap use   Sidelying ER - 3# 1 15 bilat   Sidelying SA punch 2 10 Towel roll under elbow; bilat   TB low row - blue 2 10    SA wall rolls on bolster  - orange loop 2 10    TB horizontal abd - green 2 8 Post delt, rhomboid/low trap focus         Manual Intervention (50710)      Hip mobs - inferior glide, inferior lateral glide, IR/ER mobs, anterior glide w/IR/ER - prone bilat 8 min W/mulligan belt; Gr. II-III   T/S PA mobs/manips  4 min    Subscap pin & stretch on R - sidelying     Posterior capsule stretch - sidelying     IASTM/STMob to Lumbar paraspinals bilat  8 min bilat   CT junction manip  2 min Gr. V   Sidelying lumbar roll manip   Bilat; Gr. V               NMR re-education (32713)   CUES NEEDED   Flying squirrel - glut act 10s 10 with knee lift   Prone multifidi lifts 10s 10 Cues for glut squeeze first   Half kneeling glut act - on airex 2  20s 10  5 Chops on L  Holds on R - very challenging to chop   Prone hip ext - bed flexed 5s 10 Bilat; Cues for glut act, decreased lumbar paraspinals   [place] pose glut act 10s 10 Unable to knee lift at this time, focus on isometric glut act L>R   SL glut bridge - figure 4 position 5s 10 bilat   Standing hip abd isos against wall 5s 10 bilat   Standing hip abd - orange loop at ankles 2 10 bilat   Mod.  RDL/hip airplanes - back LE supported on wall, hand support in front 1 8 Bilat; Cues for glut act; Really struggles to actively utilize R glut   Ecc KB kickstand - 10# KBs (red) 2 8 bilat   Glut step ups on 10\" - slosh tube on back - 8# 2 8 bilat         Prone T - on floor 5s 10 Bilat; Cues to decrease lat use, improve rhomboid, middle trap/low trap use   Prone snow elisabeth with sliders 1 15 Bilat; motor control focus throughout range. Difficult all the way OH. Push up plus on wall 2 10 Motor control focus   TB ER to uppercut - Green 1 10 bilat                     Therapeutic Activity (52482)      Alter G TM walk/jog - 80% BW 2 min warm up walk 1 min jog/walk x5 intervals  Medium shorts (light blue)                                   Access Code: [email or web address]  URL: [email or web address]  Date: 03/15/2022  Prepared by: Philippe Blas      Exercises  Standing Hip ER Stretch at Table - 1-2 x daily - 7 x weekly - 1 sets - 3-5 reps - 20-30s hold  Hip External Rotation Stretch - 1-2 x daily - 7 x weekly - 1 sets - 3-5 reps - 20-30s hold  Supine Hamstring Stretch with Doorway - 1-2 x daily - 7 x weekly - 1 sets - 3-5 reps - 20-30s hold  Half Kneeling Hip Flexor Stretch with Chair - 1-2 x daily - 7 x weekly - 1 sets - 3-5 reps - 20-30s hold  Sidelying Open Book Thoracic Rotation with Knee on Foam Roll - 1-2 x daily - 7 x weekly - 1 sets - 10 reps  Thoracic Extension Mobilization on Foam Roll - 1-2 x daily - 7 x weekly - 1 sets - 10 reps  Doorway Pec Stretch at 60 Elevation - 1-2 x daily - 7 x weekly - 1 sets - 10 reps - 10s hold  Scapular Protraction at Wall - 1-2 x daily - 7 x weekly - 2-3 sets - 10 reps  Quadruped Thoracic Rotation with Hand on Neck - 1-2 x daily - 7 x weekly - 1 sets - 10 reps - 5s hold        Therapeutic Exercise and NMR EXR  [x] (94617) Provided verbal/tactile cueing for activities related to strengthening, flexibility, endurance, ROM for improvements in LE, proximal hip, and core control with self care, mobility, lifting, ambulation. [x] (30678) Provided verbal/tactile cueing for activities related to improving balance, coordination, kinesthetic sense, posture, motor skill, proprioception  to assist with LE, proximal hip, and core control in self care, mobility, lifting, ambulation and eccentric single leg control. NMR and Therapeutic Activities:    [x] (12038 or 03278) Provided verbal/tactile cueing for activities related to improving balance, coordination, kinesthetic sense, posture, motor skill, proprioception and motor activation to allow for proper function of core, proximal hip and LE with self care and ADLs and functional mobility.   [] (92155) Gait Re-education- Provided training and instruction to the patient for proper LE, core and proximal hip recruitment and positioning and eccentric body weight control with ambulation re-education including up and down stairs     Home Exercise Program:    [x] (31246) Reviewed/Progressed HEP activities related to strengthening, flexibility, endurance, ROM of core, proximal hip and LE for functional self-care, mobility, lifting and ambulation/stair navigation   [] (85905)Reviewed/Progressed HEP activities related to improving balance, coordination, kinesthetic sense, posture, motor skill, proprioception of core, proximal hip and LE for self care, mobility, lifting, and ambulation/stair navigation      Manual Treatments:  PROM / STM / Oscillations-Mobs:  G-I, II, III, IV (PA's, Inf., Post.)  [x] (40114) Provided manual therapy to mobilize LE, proximal hip and/or LS spine soft tissue/joints for the purpose of modulating pain, promoting relaxation,  increasing ROM, reducing/eliminating soft tissue swelling/inflammation/restriction, improving soft tissue extensibility and allowing for proper ROM for normal function with self care, mobility, lifting and ambulation. Modalities:     [] GAME READY (VASO)- for significant edema, swelling, pain control. Charges:  Timed Code Treatment Minutes: 50   Total Treatment Minutes: 50      [] EVAL (LOW) 17060 (typically 20 minutes face-to-face)  [] EVAL (MOD) 31538 (typically 30 minutes face-to-face)  [] EVAL (HIGH) 10979 (typically 45 minutes face-to-face)  [] RE-EVAL     [] CY(30319) x     [] DRY NEEDLE 1 OR 2 MUSCLES  [x] NMR (96944) x 2    [] DRY NEEDLE 3+ MUSCLES  [x] Manual (69962) x 1      [] TA (14263) x     [] Mech Traction (62271)  [] ES(attended) (70576)     [] ES (un) (68792):   [] VASO (53057)  [] Other:    GOALS:  Patient stated goal: Squat without knee pain. Be able to lift without back pain. [x] Progressing: [] Met: [] Not Met: [] Adjusted    Therapist goals for Patient:   Short Term Goals: To be achieved in: 2 weeks  1. Independent in HEP and progression per patient tolerance, in order to prevent re-injury. [] Progressing: [x] Met: [] Not Met: [] Adjusted  2. Patient will have a decrease in pain to facilitate improvement in movement, function, and ADLs as indicated by Functional Deficits. [] Progressing: [x] Met: [] Not Met: [] Adjusted    Long Term Goals: To be achieved in: 4-6 weeks  1. Disability index score of 10% or less for the LEFS to assist with reaching prior level of function. [] Progressing: [x] Met: [] Not Met: [] Adjusted  Comment:  5% disability on LEFS as of 5/3/22  2. Disability index score of 5% or less for the LIBBY to assist with reaching prior level of function. [x] Progressing: [] Met: [] Not Met: [] Adjusted  3. Patient will be able to walk >2 miles around campus without R knee symptoms or restriction. [] Progressing: [x] Met: [] Not Met: [] Adjusted  4. Patient will be able squat under load without increased R knee symptoms or restriction. [] Progressing: [x] Met: [] Not Met: [] Adjusted  5. Patient will be able to dead lift under load without increased R knee symptoms or restriction.   [x] Progressing: [] Met: [] Not Met: [] Adjusted     ASSESSMENT: Reports that he started back to dead lifting on his own outside of PT. Did utilize some exercises performed in PT to \"prime\" the gluts prior to dead lifting and then really focused on utilizing gluts appropriately with dead lifts. Does have some mild lumbar soreness today addressed with soft tissue mobilization/IASTM, but denies reproduction of previous low back pain with dead lifting. Continued focus and progression of glut activation/motor control tasks today. Appropriately fatigued at end of session. Return to Play: (if applicable)   []  Stage 1: Intro to Strength   []  Stage 2: Return to Run and Strength   []  Stage 3: Return to Jump and Strength   []  Stage 4: Dynamic Strength and Agility   []  Stage 5: Sport Specific Training     []  Ready to Return to Play, Meets All Above Stages   []  Not Ready for Return to Sports   Comments:            Treatment/Activity Tolerance:  [x] Patient tolerated treatment well [] Patient limited by fatique  [] Patient limited by pain  [] Patient limited by other medical complications  [] Other:     Overall Progression Towards Functional goals/ Treatment Progress Update:  [x] Patient is progressing as expected towards functional goals listed. [] Progression is slowed due to complexities/Impairments listed. [] Progression has been slowed due to co-morbidities.   [] Plan just implemented, too soon to assess goals progression <30days   [] Goals require adjustment due to lack of progress  [] Patient is not progressing as expected and requires additional follow up with physician  [] Other    Prognosis for POC: [x] Good [] Fair  [] Poor    Patient requires continued skilled intervention: [x] Yes  [] No        PLAN: 2x per week for 4-6 weeks  [x] Continue per plan of care [] Alter current plan (see comments)  [] Plan of care initiated [] Hold pending MD visit [] Discharge    Electronically signed by: Julee Morales, PT, DPT, MS, SCS    Note: If patient does not return for scheduled/recommended follow up visits, this note will serve as a discharge from care along with the most recent update on progress.

## 2022-05-10 ENCOUNTER — HOSPITAL ENCOUNTER (OUTPATIENT)
Dept: PHYSICAL THERAPY | Age: 22
Setting detail: THERAPIES SERIES
Discharge: HOME | End: 2022-05-10
Payer: COMMERCIAL

## 2022-05-10 PROCEDURE — 97112 NEUROMUSCULAR REEDUCATION: CPT

## 2022-05-10 PROCEDURE — 97140 MANUAL THERAPY 1/> REGIONS: CPT

## 2022-05-10 NOTE — FLOWSHEET NOTE
[facility] [address] [facility] [address]  Phone: [phone] Fax: [phone]    Physical Therapy Treatment Note/ Progress Report:     Date:  5/10/2022    Patient Name:  Zulay Kim  \"Darek\" :  2000  MRN: [id number]  Restrictions/Precautions:    Medical/Treatment Diagnosis Information:  ·  M25.561 R knee pain; M54.9 Back pain, unspecified location, laterality, and chronicity  ·  M25.561 R knee pain; M54.9 Back pain, unspecified location, laterality, and chronicity  Insurance/Certification information:   Leana Yuan   Physician Information:   Dr. Luna Carrion of care signed (Y/N):     Date of Patient follow up with Physician:      Progress Report: []  Yes  [x]  No     Date Range for reporting period:  Beginning:  3/15/22  Endin days or 10 visits    Progress report due (10 Rx/or 30 days whichever is less):      Recertification due (POC duration/ or 90 days whichever is less): 6/3/22     Visit # Insurance Allowable Auth Needed   13 BCBS - 75 visits []Yes   [x]No     Latex Allergy:  [x]NO      []YES  Preferred Language for Healthcare:   [x]English       []other:   Functional Scale: 5% disability - LEFS (76/80); 4% disability - LIBBY (2/50)  Date assessed:  5/3/22    Pain level:  R knee:  0/10 at start of session; 3/10 at worst.  Low back:  0/10 at start of session; 3/10 at worst.      SUBJECTIVE:  Reports that he was able to back squat 185# starting weight yesterday. Denies any R knee or back pain with back squatting. OBJECTIVE: 5/3/22   Observation:    Test measurements:    ROM LEFT RIGHT   Lumbar Flex WNL     Lumbar Ext WNL      Lumbar SB WNL              HIP Flex 120 120   HIP Abd       HIP Ext       HIP IR 30 30   HIP ER 45 38   Knee ext 0 0   Knee Flex WNL WNL  No pain.    Ankle PF       Ankle DF       Ankle In       Ankle Ev       Strength  LEFT RIGHT   HIP Flexors       HIP Abductors 4+/5 4+/5    HIP Ext 4+/5 Good act pattern noted. Feels increased tightness/stiffness in low back. Poor endurance. 4+/5 Good act pattern noted. Feels increased tightness/stiffness in low back. Poor endurance. Hip ER       Knee EXT (quad) 4+/5 4+/5   Knee Flex (HS) 4+/5 4+/5 Weaker at longer muscle length. Poor endurance. Ankle DF       Ankle PF       Ankle Inv       Ankle EV               Circumference  Mid apex  7 cm prox         No significant swelling evident.         Joint mobility: R>L hip mobility; GHJ IR/posterior capsule mobility on R>L              []?Normal                       [x]? Hypo              []?Hyper     Functional Mobility/Transfers:   Squat:  Good mechanics noted; no posterior R knee or fibular head pain at full depth.      Posture: Significant lumbar paraspinal bulk noted; decreased lumbar lordosis; decreased thoracic kyphosis.     Gait: No significant gait deviations noted, no antalgia evident.     Orthopedic Special Tests: Hamstring flexibility mildly restricted bilaterally. Rectus femoris length normal bilaterally. Hip flexor length mildly restricted bilaterally. Subscap flexibility mildly restricted in end range OH R shoulder flexion. *Noted poor rhomboid, low trap, middle trap endurance. Motor control when moving into OH ranges is improving, but still difficult when fully OH. Fatigues quickly.       RESTRICTIONS/PRECAUTIONS:    Exercises/Interventions:     Therapeutic Ex (23656)  Sets/sec Reps Notes/CUES   Hip ER stretch on mat - standing 20-30s 3-5 Bilat; HEP   [place] stretch 20-30s 3-5 Bilat; HEP   Banded hip IR/ER self mobs 1 6-12  Each position [place] pose position  Half kneeling w/self IR w/hand   Half kneeling rectus femoris stretch w/strap 20-30s 3-5 Bilat; HEP   Hamstring stretch in doorway  2-3 min LLLD stretch; bilat; HEP   TS self mobs w/foam roller 1 6-12 Each segment   Sidelying T/L rotation stretch 1 10 bilat   Quadruped T/S rotations 1 10 Cues to drive through stance UE for SA act   T/S ext over chair back   Attempted, but patient extends only at painful segment resulting in increased pain, so HELD. Subscap/lat stretch on table 10s 10 bilat   Sleeper stretch 10s 10 On R   Doorway ER stretch - 60 deg elevation, low angle 10s 10 bilat   Foam roller pec stretch  3-5 min Varying angles to get at a variety of pec fiber angles         Prone scap retractions - on floor 5s 10 Bilat; Cues to decrease lat use, improve rhomboid, middle trap use   Sidelying ER - 3# 1 15 bilat   Sidelying SA punch 2 10 Towel roll under elbow; bilat   TB low row - blue 2 10    SA wall rolls on bolster  - orange loop 2 10    TB horizontal abd - green 2 8 Post delt, rhomboid/low trap focus         Manual Intervention (26879)      Hip mobs - inferior glide, inferior lateral glide, IR/ER mobs, anterior glide w/IR/ER - prone bilat 10 min W/mulligan belt; Gr. II-III   T/S PA mobs/manips  5 min    Subscap pin & stretch on R - sidelying     Posterior capsule stretch - sidelying     IASTM/STMob to Lumbar paraspinals bilat   bilat   CT junction manip  2 min Gr. V   Sidelying lumbar roll manip   Bilat; Gr. V               NMR re-education (05573)   CUES NEEDED   Flying squirrel - glut act 10s 10 with knee lift   Prone multifidi lifts 10s 10 Cues for glut squeeze first   Half kneeling glut act - on airex 2  20s 10  5 Chops on L  Holds on R - very challenging to chop   Prone hip ext - bed flexed 5s 10 Bilat; Cues for glut act, decreased lumbar paraspinals   [place] pose glut act 10s 10 Unable to knee lift at this time, focus on isometric glut act L>R   SL glut bridge - figure 4 position 5s 10 bilat   Standing hip abd isos against wall 5s 10 bilat   Standing hip abd - orange loop at ankles 2 10 bilat   Mod.  RDL/hip airplanes - back LE supported on wall, hand support in front 1 8 Bilat; Cues for glut act; Really struggles to actively utilize R glut   Ecc KB kickstand - 10# KBs (red) 2 8 bilat   Glut step ups on 10\" - slosh tube on back - 8# 2 8 bilat   TRX ripstick SL press - ipsilateral leg 5s 10 bilat   TRX ripstick press w/OH flexion, terminal ext focus 1 10 bilat         Prone T - on floor 5s 10 Bilat; Cues to decrease lat use, improve rhomboid, middle trap/low trap use   Prone snow elisabeth with sliders 1 15 Bilat; motor control focus throughout range. Difficult all the way OH. Push up plus on wall 2 10 Motor control focus   TB ER to uppercut - Green 1 10 bilat                     Therapeutic Activity (09725)      Alter G TM walk/jog - 80% BW 2 min warm up walk 1 min jog/walk x5 intervals  Medium shorts (light blue)                                   Access Code: [email or web address]  URL: [employer] [email or web address] com/  Date: 03/15/2022  Prepared by: Arlette Song      Exercises  Standing Hip ER Stretch at Table - 1-2 x daily - 7 x weekly - 1 sets - 3-5 reps - 20-30s hold  Hip External Rotation Stretch - 1-2 x daily - 7 x weekly - 1 sets - 3-5 reps - 20-30s hold  Supine Hamstring Stretch with Doorway - 1-2 x daily - 7 x weekly - 1 sets - 3-5 reps - 20-30s hold  Half Kneeling Hip Flexor Stretch with Chair - 1-2 x daily - 7 x weekly - 1 sets - 3-5 reps - 20-30s hold  Sidelying Open Book Thoracic Rotation with Knee on Foam Roll - 1-2 x daily - 7 x weekly - 1 sets - 10 reps  Thoracic Extension Mobilization on Foam Roll - 1-2 x daily - 7 x weekly - 1 sets - 10 reps  Doorway Pec Stretch at 60 Elevation - 1-2 x daily - 7 x weekly - 1 sets - 10 reps - 10s hold  Scapular Protraction at Wall - 1-2 x daily - 7 x weekly - 2-3 sets - 10 reps  Quadruped Thoracic Rotation with Hand on Neck - 1-2 x daily - 7 x weekly - 1 sets - 10 reps - 5s hold        Therapeutic Exercise and NMR EXR  [x] (22296) Provided verbal/tactile cueing for activities related to strengthening, flexibility, endurance, ROM for improvements in LE, proximal hip, and core control with self care, mobility, lifting, ambulation.   [x] (91299) Provided verbal/tactile cueing for activities related to improving balance, coordination, kinesthetic sense, posture, motor skill, proprioception  to assist with LE, proximal hip, and core control in self care, mobility, lifting, ambulation and eccentric single leg control. NMR and Therapeutic Activities:    [x] (48618 or 75945) Provided verbal/tactile cueing for activities related to improving balance, coordination, kinesthetic sense, posture, motor skill, proprioception and motor activation to allow for proper function of core, proximal hip and LE with self care and ADLs and functional mobility.   [] (35025) Gait Re-education- Provided training and instruction to the patient for proper LE, core and proximal hip recruitment and positioning and eccentric body weight control with ambulation re-education including up and down stairs     Home Exercise Program:    [x] (10083) Reviewed/Progressed HEP activities related to strengthening, flexibility, endurance, ROM of core, proximal hip and LE for functional self-care, mobility, lifting and ambulation/stair navigation   [] (99658)Reviewed/Progressed HEP activities related to improving balance, coordination, kinesthetic sense, posture, motor skill, proprioception of core, proximal hip and LE for self care, mobility, lifting, and ambulation/stair navigation      Manual Treatments:  PROM / STM / Oscillations-Mobs:  G-I, II, III, IV (PA's, Inf., Post.)  [x] (37026) Provided manual therapy to mobilize LE, proximal hip and/or LS spine soft tissue/joints for the purpose of modulating pain, promoting relaxation,  increasing ROM, reducing/eliminating soft tissue swelling/inflammation/restriction, improving soft tissue extensibility and allowing for proper ROM for normal function with self care, mobility, lifting and ambulation. Modalities:     [] GAME READY (VASO)- for significant edema, swelling, pain control.      Charges:  Timed Code Treatment Minutes: 50   Total Treatment Minutes: 50      [] EVAL (LOW) 47196 (typically 20 minutes face-to-face)  [] EVAL (MOD) 41531 (typically 30 minutes face-to-face)  [] EVAL (HIGH) 58988 (typically 45 minutes face-to-face)  [] RE-EVAL     [] OX(44370) x     [] DRY NEEDLE 1 OR 2 MUSCLES  [x] NMR (11500) x 2    [] DRY NEEDLE 3+ MUSCLES  [x] Manual (42550) x 1      [] TA (20954) x     [] Mech Traction (09336)  [] ES(attended) (79302)     [] ES (un) (75945):   [] VASO (57371)  [] Other:    GOALS:  Patient stated goal: Squat without knee pain. Be able to lift without back pain. [x] Progressing: [] Met: [] Not Met: [] Adjusted    Therapist goals for Patient:   Short Term Goals: To be achieved in: 2 weeks  1. Independent in HEP and progression per patient tolerance, in order to prevent re-injury. [] Progressing: [x] Met: [] Not Met: [] Adjusted  2. Patient will have a decrease in pain to facilitate improvement in movement, function, and ADLs as indicated by Functional Deficits. [] Progressing: [x] Met: [] Not Met: [] Adjusted    Long Term Goals: To be achieved in: 4-6 weeks  1. Disability index score of 10% or less for the LEFS to assist with reaching prior level of function. [] Progressing: [x] Met: [] Not Met: [] Adjusted  Comment:  5% disability on LEFS as of 5/3/22  2. Disability index score of 5% or less for the LIBBY to assist with reaching prior level of function. [x] Progressing: [] Met: [] Not Met: [] Adjusted  3. Patient will be able to walk >2 miles around campus without R knee symptoms or restriction. [] Progressing: [x] Met: [] Not Met: [] Adjusted  4. Patient will be able squat under load without increased R knee symptoms or restriction. [] Progressing: [x] Met: [] Not Met: [] Adjusted  5. Patient will be able to dead lift under load without increased R knee symptoms or restriction. [x] Progressing: [] Met: [] Not Met: [] Adjusted     ASSESSMENT: Reports that he started back to loaded back squatting in the gym. Denies any R knee pain or back pain with loaded back squatting.  Continued focus and progression of glut activation/motor control and endurance tasks. Did incorporate some anti-rotational tasks today, which were a good challenge for patient. Appropriately fatigued at end of session. Tends to feel more tight in lumbar spine when gluts start to fatigue. Return to Play: (if applicable)   []  Stage 1: Intro to Strength   []  Stage 2: Return to Run and Strength   []  Stage 3: Return to Jump and Strength   []  Stage 4: Dynamic Strength and Agility   []  Stage 5: Sport Specific Training     []  Ready to Return to Play, Meets All Above Stages   []  Not Ready for Return to Sports   Comments:            Treatment/Activity Tolerance:  [x] Patient tolerated treatment well [] Patient limited by fatique  [] Patient limited by pain  [] Patient limited by other medical complications  [] Other:     Overall Progression Towards Functional goals/ Treatment Progress Update:  [x] Patient is progressing as expected towards functional goals listed. [] Progression is slowed due to complexities/Impairments listed. [] Progression has been slowed due to co-morbidities. [] Plan just implemented, too soon to assess goals progression <30days   [] Goals require adjustment due to lack of progress  [] Patient is not progressing as expected and requires additional follow up with physician  [] Other    Prognosis for POC: [x] Good [] Fair  [] Poor    Patient requires continued skilled intervention: [x] Yes  [] No        PLAN: 2x per week for 4-6 weeks  [x] Continue per plan of care [] Alter current plan (see comments)  [] Plan of care initiated [] Hold pending MD visit [] Discharge    Electronically signed by: Echo Malave, PT, DPT, MS, SCS    Note: If patient does not return for scheduled/recommended follow up visits, this note will serve as a discharge from care along with the most recent update on progress.

## 2022-05-12 ENCOUNTER — HOSPITAL ENCOUNTER (OUTPATIENT)
Dept: PHYSICAL THERAPY | Age: 22
Setting detail: THERAPIES SERIES
Discharge: HOME | End: 2022-05-12
Payer: COMMERCIAL

## 2022-05-12 PROCEDURE — 97140 MANUAL THERAPY 1/> REGIONS: CPT

## 2022-05-12 PROCEDURE — 97110 THERAPEUTIC EXERCISES: CPT

## 2022-05-12 PROCEDURE — 97112 NEUROMUSCULAR REEDUCATION: CPT

## 2022-05-12 NOTE — DISCHARGE SUMMARY
[facility] [address] [facility]  Phone: [phone] Fax: [phone]    Physical Therapy Treatment Note/ Progress Report:     Date:  2022    Patient Name:  Romayne Clan  \"Darek\" :  2000  MRN: [id number]  Restrictions/Precautions:    Medical/Treatment Diagnosis Information:  ·  M25.561 R knee pain; M54.9 Back pain, unspecified location, laterality, and chronicity  ·  M25.561 R knee pain; M54.9 Back pain, unspecified location, laterality, and chronicity  Insurance/Certification information:   Tariq Mcdonald 150   Physician Information:   Dr. Dana Nguyen of care signed (Y/N):     Date of Patient follow up with Physician:      Progress Report: []  Yes  [x]  No     Date Range for reporting period:  Beginning:  3/15/22  Endin days or 10 visits    Progress report due (10 Rx/or 30 days whichever is less): 76     Recertification due (POC duration/ or 90 days whichever is less): 6/3/22     Visit # Insurance Allowable Auth Needed   14 [employer]BS - 75 visits []Yes   [x]No     Latex Allergy:  [x]NO      []YES  Preferred Language for Healthcare:   [x]English       []other:   Functional Scale: 5% disability - LEFS (76/80); 4% disability - LIBBY (2/50)  Date assessed:  5/3/22    Pain level:  R knee:  0/10 at start of session; 3/10 at worst.  Low to mid back:  0/10 at start of session; 3/10 at worst.      SUBJECTIVE:  Overall, feels like he is back to 75% function including getting back to previous lifting activities in the gym. Definitely doing a lot more now, mostly pain free compared to when he first started PT.    OBJECTIVE: 5/3/22   Observation:    Test measurements:    ROM LEFT RIGHT   Lumbar Flex WNL     Lumbar Ext WNL      Lumbar SB WNL              HIP Flex 120 120   HIP Abd       HIP Ext       HIP IR 30 30   HIP ER 45 38   Knee ext 0 0   Knee Flex WNL WNL  No pain.    Ankle PF       Ankle DF       Ankle In       Ankle Ev     Strength  LEFT RIGHT   HIP Flexors       HIP Abductors 4+/5 4+/5    HIP Ext 4+/5 Good act pattern noted. Feels increased tightness/stiffness in low back. Poor endurance. 4+/5 Good act pattern noted. Feels increased tightness/stiffness in low back. Poor endurance. Hip ER       Knee EXT (quad) 4+/5 4+/5   Knee Flex (HS) 4+/5 4+/5 Weaker at longer muscle length. Poor endurance. Ankle DF       Ankle PF       Ankle Inv       Ankle EV               Circumference  Mid apex  7 cm prox         No significant swelling evident.         Joint mobility: R>L hip mobility; GHJ IR/posterior capsule mobility on R>L              []?Normal                       [x]? Hypo              []?Hyper     Functional Mobility/Transfers:   Squat:  Good mechanics noted; no posterior R knee or fibular head pain at full depth.      Posture: Significant lumbar paraspinal bulk noted; decreased lumbar lordosis; decreased thoracic kyphosis.     Gait: No significant gait deviations noted, no antalgia evident.     Orthopedic Special Tests: Hamstring flexibility mildly restricted bilaterally. Rectus femoris length normal bilaterally. Hip flexor length mildly restricted bilaterally. Subscap flexibility mildly restricted in end range OH R shoulder flexion. *Noted poor rhomboid, low trap, middle trap endurance. Motor control when moving into OH ranges is improving, but still difficult when fully OH. Fatigues quickly.       RESTRICTIONS/PRECAUTIONS:    Exercises/Interventions:     Therapeutic Ex (10911)  Sets/sec Reps Notes/CUES   Hip ER stretch on mat - standing 20-30s 3-5 Bilat; HEP   Tanner stretch 20-30s 3-5 Bilat; HEP   Banded hip IR/ER self mobs 1 6-12  Each position Tanner pose position  Half kneeling w/self IR w/hand   Half kneeling rectus femoris stretch w/strap 20-30s 3-5 Bilat; HEP   Hamstring stretch in doorway  2-3 min LLLD stretch; bilat; HEP   TS self mobs w/foam roller 1 6-12 Each segment   Sidelying T/L rotation stretch 1 10 bilat Quadruped T/S rotations 1 10 Cues to drive through stance UE for SA act   T/S ext over chair back   Attempted, but patient extends only at painful segment resulting in increased pain, so HELD. Subscap/lat stretch on table 10s 10 bilat   Sleeper stretch 10s 10 On R   Doorway ER stretch - 60 deg elevation, low angle 10s 10 bilat   Foam roller pec stretch  3-5 min Varying angles to get at a variety of pec fiber angles         Prone scap retractions - on floor 5s 10 Bilat; Cues to decrease lat use, improve rhomboid, middle trap use   Sidelying ER - 3# 1 15 bilat   Sidelying SA punch 2 10 Towel roll under elbow; bilat   TB low row - blue 2 10    SA wall rolls on bolster  - orange loop 2 10    TB horizontal abd - green 2 8 Post delt, rhomboid/low trap focus         Manual Intervention (37423)      Hip mobs - inferior glide, inferior lateral glide, IR/ER mobs, anterior glide w/IR/ER - prone bilat 10 min W/mulligan belt; Gr. II-III   T/S PA mobs/manips  5 min    Subscap pin & stretch on R - supine  3 min    GH IR stretching @90/90  4 min    Posterior capsule stretch - sidelying     IASTM/STMob to Lumbar paraspinals bilat   bilat   CT junction manip  2 min Gr. V   Sidelying lumbar roll manip   Bilat; Gr. V               NMR re-education (39377)   CUES NEEDED   Flying squirrel - glut act 10s 10 with knee lift   Prone multifidi lifts 10s 10 Cues for glut squeeze first   Half kneeling glut act - on airex 2  20s 10  5 Chops on L  Holds on R - very challenging to chop   Prone hip ext - bed flexed 5s 10 Bilat; Cues for glut act, decreased lumbar paraspinals   [place] pose glut act 10s 10 Unable to knee lift at this time, focus on isometric glut act L>R   SL glut bridge - figure 4 position 5s 10 bilat   Standing hip abd isos against wall 5s 10 bilat   Standing hip abd - orange loop at ankles 2 10 bilat   Mod.  RDL/hip airplanes - back LE supported on wall, hand support in front 1 8 Bilat; Cues for glut act; Really struggles to actively utilize R glut   Ecc KB kickstand - 10# KBs (red) 2 8 bilat   Glut step ups on 10\" - slosh tube on back - 8# 2 8 bilat   TRX ripstick SL press - ipsilateral leg 5s 10 bilat   TRX ripstick press w/OH flexion, terminal ext focus 1 10 bilat         Prone T - on floor 5s 10 Bilat; Cues to decrease lat use, improve rhomboid, middle trap/low trap use   Prone snow elisabeth with sliders 1 15 Bilat; motor control focus throughout range. Difficult all the way OH. Ball on wall - Red sand ball - flexion/abd 2 10 cw/ccw   Push up plus on high mat 2 10 Motor control focus   TB ER to uppercut - Green 1 10 bilat   TB resisted ER at side and at 90/90 neutral - orange loop - walking 2 15 feet Each position; aim to get to point of fatigue               Therapeutic Activity (34493)      Alter G TM walk/jog - 80% BW 2 min warm up walk 1 min jog/walk x5 intervals  Medium shorts (light blue)                                   Access Code: [email or web address]  URL: [email or web address]  Date: 03/15/2022  Prepared by: Christiano Brady      Exercises  Standing Hip ER Stretch at Table - 1-2 x daily - 7 x weekly - 1 sets - 3-5 reps - 20-30s hold  Hip External Rotation Stretch - 1-2 x daily - 7 x weekly - 1 sets - 3-5 reps - 20-30s hold  Supine Hamstring Stretch with Doorway - 1-2 x daily - 7 x weekly - 1 sets - 3-5 reps - 20-30s hold  Half Kneeling Hip Flexor Stretch with Chair - 1-2 x daily - 7 x weekly - 1 sets - 3-5 reps - 20-30s hold  Sidelying Open Book Thoracic Rotation with Knee on Foam Roll - 1-2 x daily - 7 x weekly - 1 sets - 10 reps  Thoracic Extension Mobilization on Foam Roll - 1-2 x daily - 7 x weekly - 1 sets - 10 reps  Doorway Pec Stretch at 60 Elevation - 1-2 x daily - 7 x weekly - 1 sets - 10 reps - 10s hold  Scapular Protraction at Wall - 1-2 x daily - 7 x weekly - 2-3 sets - 10 reps  Quadruped Thoracic Rotation with Hand on Neck - 1-2 x daily - 7 x weekly - 1 sets - 10 reps - 5s hold        Therapeutic Exercise and NMR EXR  [x] (62198) Provided verbal/tactile cueing for activities related to strengthening, flexibility, endurance, ROM for improvements in LE, proximal hip, and core control with self care, mobility, lifting, ambulation. [x] (84622) Provided verbal/tactile cueing for activities related to improving balance, coordination, kinesthetic sense, posture, motor skill, proprioception  to assist with LE, proximal hip, and core control in self care, mobility, lifting, ambulation and eccentric single leg control.      NMR and Therapeutic Activities:    [x] (00556 or 10357) Provided verbal/tactile cueing for activities related to improving balance, coordination, kinesthetic sense, posture, motor skill, proprioception and motor activation to allow for proper function of core, proximal hip and LE with self care and ADLs and functional mobility.   [] (31941) Gait Re-education- Provided training and instruction to the patient for proper LE, core and proximal hip recruitment and positioning and eccentric body weight control with ambulation re-education including up and down stairs     Home Exercise Program:    [x] (77181) Reviewed/Progressed HEP activities related to strengthening, flexibility, endurance, ROM of core, proximal hip and LE for functional self-care, mobility, lifting and ambulation/stair navigation   [] (65902)Reviewed/Progressed HEP activities related to improving balance, coordination, kinesthetic sense, posture, motor skill, proprioception of core, proximal hip and LE for self care, mobility, lifting, and ambulation/stair navigation      Manual Treatments:  PROM / STM / Oscillations-Mobs:  G-I, II, III, IV (PA's, Inf., Post.)  [x] (97593) Provided manual therapy to mobilize LE, proximal hip and/or LS spine soft tissue/joints for the purpose of modulating pain, promoting relaxation,  increasing ROM, reducing/eliminating soft tissue swelling/inflammation/restriction, improving soft tissue extensibility and allowing for proper ROM for normal function with self care, mobility, lifting and ambulation. Modalities:     [] GAME READY (VASO)- for significant edema, swelling, pain control. Charges:  Timed Code Treatment Minutes: 50   Total Treatment Minutes: 50      [] EVAL (LOW) 96783 (typically 20 minutes face-to-face)  [] EVAL (MOD) 98182 (typically 30 minutes face-to-face)  [] EVAL (HIGH) 24070 (typically 45 minutes face-to-face)  [] RE-EVAL     [x] JX(13250) x 1    [] DRY NEEDLE 1 OR 2 MUSCLES  [x] NMR (43276) x 1    [] DRY NEEDLE 3+ MUSCLES  [x] Manual (28263) x 1      [] TA (37106) x     [] Mech Traction (12359)  [] ES(attended) (61328)     [] ES (un) (19460):   [] VASO (62740)  [] Other:    GOALS:  Patient stated goal: Squat without knee pain. Be able to lift without back pain. [] Progressing: [x] Met: [] Not Met: [] Adjusted    Therapist goals for Patient:   Short Term Goals: To be achieved in: 2 weeks  1. Independent in HEP and progression per patient tolerance, in order to prevent re-injury. [] Progressing: [x] Met: [] Not Met: [] Adjusted  2. Patient will have a decrease in pain to facilitate improvement in movement, function, and ADLs as indicated by Functional Deficits. [] Progressing: [x] Met: [] Not Met: [] Adjusted    Long Term Goals: To be achieved in: 4-6 weeks  1. Disability index score of 10% or less for the LEFS to assist with reaching prior level of function. [] Progressing: [x] Met: [] Not Met: [] Adjusted  Comment:  5% disability on LEFS as of 5/3/22  2. Disability index score of 5% or less for the LIBBY to assist with reaching prior level of function. [x] Progressing: [] Met: [] Not Met: [] Adjusted  3. Patient will be able to walk >2 miles around campus without R knee symptoms or restriction. [] Progressing: [x] Met: [] Not Met: [] Adjusted  4. Patient will be able squat under load without increased R knee symptoms or restriction. [] Progressing: [x] Met: [] Not Met: [] Adjusted  5.  Patient will be able to dead lift under load without increased R knee symptoms or restriction. [x] Progressing: [] Met: [] Not Met: [] Adjusted     ASSESSMENT: Overall, feels like he is back to about 75% function including performing previous gym/lifting activities. Fitness requirements for [facility] are higher than the average patient, so patient does need to get back to some of these previous lifting activities to meet these fitness requirements. Patient is back to tolerating loaded back squatting in the gym and dead lifting, both activities that were previously reproducing mid back pain and R posterior knee pain sxs. Discussed appropriate progression of these tasks outside of PT for continued progression back to previous activity/lifting levels. Provided patient with a comprehensive HEP addressing glut activation/motor control and endurance deficits as well as T/S mobility, pec flexibility, shoulder flexibility/mobility, and shoulder strength/motor control deficits contributing to mid back and R knee pain. Patient to return home to [place] for the summer and will be discharged to [facility] at this time. Encouraged patient to continue PT at home if needed because continued progression on his own not occurring. Return to Play: (if applicable)   []  Stage 1: Intro to Strength   []  Stage 2: Return to Run and Strength   []  Stage 3: Return to Jump and Strength   []  Stage 4: Dynamic Strength and Agility   []  Stage 5: Sport Specific Training     []  Ready to Return to Play, Meets All Above Stages   []  Not Ready for Return to Sports   Comments:            Treatment/Activity Tolerance:  [x] Patient tolerated treatment well [] Patient limited by fatique  [] Patient limited by pain  [] Patient limited by other medical complications  [] Other:     Overall Progression Towards Functional goals/ Treatment Progress Update:  [x] Patient is progressing as expected towards functional goals listed.     [] Progression is slowed due to complexities/Impairments listed. [] Progression has been slowed due to co-morbidities. [] Plan just implemented, too soon to assess goals progression <30days   [] Goals require adjustment due to lack of progress  [] Patient is not progressing as expected and requires additional follow up with physician  [] Other    Prognosis for POC: [x] Good [] Fair  [] Poor    Patient requires continued skilled intervention: [x] Yes  [] No        PLAN:Discharge to [facility]. Going home to [place] for the summer. [] Continue per plan of care [] Alter current plan (see comments)  [] Plan of care initiated [] Hold pending MD visit [x] Discharge    Electronically signed by: Juliana Lott, PT, DPT, MS, SCS    Note: If patient does not return for scheduled/recommended follow up visits, this note will serve as a discharge from care along with the most recent update on progress.